# Patient Record
Sex: FEMALE | Race: WHITE | NOT HISPANIC OR LATINO | Employment: FULL TIME | URBAN - METROPOLITAN AREA
[De-identification: names, ages, dates, MRNs, and addresses within clinical notes are randomized per-mention and may not be internally consistent; named-entity substitution may affect disease eponyms.]

---

## 2023-03-21 DIAGNOSIS — F32.1 MODERATE MAJOR DEPRESSION (MULTI): ICD-10-CM

## 2023-03-21 DIAGNOSIS — E78.2 MIXED HYPERLIPIDEMIA: ICD-10-CM

## 2023-03-21 DIAGNOSIS — E11.65 TYPE 2 DIABETES MELLITUS WITH HYPERGLYCEMIA, WITHOUT LONG-TERM CURRENT USE OF INSULIN (MULTI): ICD-10-CM

## 2023-03-21 DIAGNOSIS — F41.9 ANXIETY: Primary | ICD-10-CM

## 2023-03-21 PROBLEM — N39.0 URINARY TRACT INFECTION, SITE NOT SPECIFIED: Status: ACTIVE | Noted: 2023-03-01

## 2023-03-21 PROBLEM — F17.210 CIGARETTE NICOTINE DEPENDENCE WITHOUT COMPLICATION: Status: ACTIVE | Noted: 2023-03-21

## 2023-03-21 PROBLEM — M79.89 OTHER SPECIFIED SOFT TISSUE DISORDERS: Status: ACTIVE | Noted: 2023-03-20

## 2023-03-21 PROBLEM — Z78.9 CAFFEINE USE: Status: ACTIVE | Noted: 2023-03-21

## 2023-03-21 PROBLEM — M65.4 DE QUERVAIN'S DISEASE (TENOSYNOVITIS): Status: ACTIVE | Noted: 2023-03-21

## 2023-03-21 PROBLEM — N92.6 IRREGULAR MENSES: Status: ACTIVE | Noted: 2023-03-21

## 2023-03-21 PROBLEM — R10.9 ABDOMINAL PAIN: Status: ACTIVE | Noted: 2023-03-21

## 2023-03-21 PROBLEM — R10.31 RIGHT LOWER QUADRANT PAIN: Status: ACTIVE | Noted: 2023-03-01

## 2023-03-21 PROBLEM — R11.0 NAUSEA: Status: ACTIVE | Noted: 2023-03-01

## 2023-03-21 PROBLEM — K42.9 UMBILICAL HERNIA WITHOUT OBSTRUCTION OR GANGRENE: Status: ACTIVE | Noted: 2023-03-01

## 2023-03-21 PROBLEM — H40.9 GLAUCOMA, RIGHT EYE: Status: ACTIVE | Noted: 2023-03-21

## 2023-03-21 PROBLEM — M53.9 MULTILEVEL DEGENERATIVE DISC DISEASE: Status: ACTIVE | Noted: 2023-03-21

## 2023-03-21 RX ORDER — GLIMEPIRIDE 4 MG/1
4 TABLET ORAL 2 TIMES DAILY
Qty: 180 TABLET | Refills: 1
Start: 2023-03-21 | End: 2023-05-18 | Stop reason: WASHOUT

## 2023-03-21 RX ORDER — FLUOXETINE HYDROCHLORIDE 20 MG/1
20 CAPSULE ORAL DAILY
Qty: 90 CAPSULE | Refills: 1
Start: 2023-03-21 | End: 2023-09-11 | Stop reason: SDUPTHER

## 2023-03-21 RX ORDER — ATORVASTATIN CALCIUM 20 MG/1
20 TABLET, FILM COATED ORAL DAILY
Qty: 90 TABLET | Refills: 1
Start: 2023-03-21 | End: 2024-03-04

## 2023-03-21 RX ORDER — METFORMIN HYDROCHLORIDE 1000 MG/1
1000 TABLET ORAL
Qty: 180 TABLET | Refills: 1
Start: 2023-03-21 | End: 2023-05-18 | Stop reason: WASHOUT

## 2023-03-21 RX ORDER — GLIMEPIRIDE 4 MG/1
4 TABLET ORAL 2 TIMES DAILY
COMMUNITY
Start: 2022-06-17 | End: 2023-03-21 | Stop reason: SDUPTHER

## 2023-03-21 RX ORDER — FLUOXETINE HYDROCHLORIDE 20 MG/1
20 CAPSULE ORAL DAILY
COMMUNITY
End: 2023-03-21 | Stop reason: SDUPTHER

## 2023-03-21 RX ORDER — ATORVASTATIN CALCIUM 20 MG/1
20 TABLET, FILM COATED ORAL DAILY
COMMUNITY
End: 2023-03-21 | Stop reason: SDUPTHER

## 2023-03-21 RX ORDER — METFORMIN HYDROCHLORIDE 1000 MG/1
1000 TABLET ORAL
COMMUNITY
End: 2023-03-21 | Stop reason: SDUPTHER

## 2023-03-21 RX ORDER — ATORVASTATIN CALCIUM 20 MG/1
20 TABLET, FILM COATED ORAL DAILY
Qty: 30 TABLET | Refills: 2 | COMMUNITY
Start: 2022-12-05 | End: 2023-03-05 | Stop reason: WASHOUT

## 2023-04-26 ENCOUNTER — HOSPITAL ENCOUNTER (OUTPATIENT)
Dept: DATA CONVERSION | Facility: HOSPITAL | Age: 33
End: 2023-04-26
Attending: SURGERY | Admitting: SURGERY
Payer: COMMERCIAL

## 2023-04-26 DIAGNOSIS — E66.9 OBESITY, UNSPECIFIED: ICD-10-CM

## 2023-04-26 DIAGNOSIS — E78.5 HYPERLIPIDEMIA, UNSPECIFIED: ICD-10-CM

## 2023-04-26 DIAGNOSIS — E11.9 TYPE 2 DIABETES MELLITUS WITHOUT COMPLICATIONS (MULTI): ICD-10-CM

## 2023-04-26 DIAGNOSIS — Z79.4 LONG TERM (CURRENT) USE OF INSULIN (MULTI): ICD-10-CM

## 2023-04-26 DIAGNOSIS — F17.200 NICOTINE DEPENDENCE, UNSPECIFIED, UNCOMPLICATED: ICD-10-CM

## 2023-04-26 DIAGNOSIS — F41.9 ANXIETY DISORDER, UNSPECIFIED: ICD-10-CM

## 2023-04-26 DIAGNOSIS — K42.0 UMBILICAL HERNIA WITH OBSTRUCTION, WITHOUT GANGRENE: ICD-10-CM

## 2023-04-26 DIAGNOSIS — K42.9 UMBILICAL HERNIA WITHOUT OBSTRUCTION OR GANGRENE: ICD-10-CM

## 2023-04-26 LAB — POCT GLUCOSE: 161 MG/DL (ref 74–99)

## 2023-05-02 LAB — HCG, URINE: NEGATIVE

## 2023-05-18 ENCOUNTER — OFFICE VISIT (OUTPATIENT)
Dept: PRIMARY CARE | Facility: CLINIC | Age: 33
End: 2023-05-18
Payer: COMMERCIAL

## 2023-05-18 VITALS
HEIGHT: 69 IN | TEMPERATURE: 98 F | RESPIRATION RATE: 16 BRPM | HEART RATE: 76 BPM | DIASTOLIC BLOOD PRESSURE: 80 MMHG | WEIGHT: 293 LBS | SYSTOLIC BLOOD PRESSURE: 122 MMHG | BODY MASS INDEX: 43.4 KG/M2 | OXYGEN SATURATION: 96 %

## 2023-05-18 DIAGNOSIS — F17.210 CIGARETTE NICOTINE DEPENDENCE WITHOUT COMPLICATION: ICD-10-CM

## 2023-05-18 DIAGNOSIS — E66.01 CLASS 3 SEVERE OBESITY DUE TO EXCESS CALORIES WITH SERIOUS COMORBIDITY AND BODY MASS INDEX (BMI) OF 45.0 TO 49.9 IN ADULT (MULTI): ICD-10-CM

## 2023-05-18 DIAGNOSIS — F41.9 ANXIETY: ICD-10-CM

## 2023-05-18 DIAGNOSIS — F32.1 MODERATE MAJOR DEPRESSION (MULTI): ICD-10-CM

## 2023-05-18 DIAGNOSIS — H40.9 GLAUCOMA OF RIGHT EYE, UNSPECIFIED GLAUCOMA TYPE: ICD-10-CM

## 2023-05-18 DIAGNOSIS — E11.65 TYPE 2 DIABETES MELLITUS WITH HYPERGLYCEMIA, WITHOUT LONG-TERM CURRENT USE OF INSULIN (MULTI): Primary | ICD-10-CM

## 2023-05-18 DIAGNOSIS — M53.9 MULTILEVEL DEGENERATIVE DISC DISEASE: ICD-10-CM

## 2023-05-18 DIAGNOSIS — E78.2 MIXED HYPERLIPIDEMIA: ICD-10-CM

## 2023-05-18 DIAGNOSIS — M72.6 NECROTIZING FASCIITIS (MULTI): ICD-10-CM

## 2023-05-18 PROBLEM — K42.9 UMBILICAL HERNIA WITHOUT OBSTRUCTION OR GANGRENE: Status: RESOLVED | Noted: 2023-03-01 | Resolved: 2023-05-18

## 2023-05-18 PROBLEM — R10.9 ABDOMINAL PAIN: Status: RESOLVED | Noted: 2023-03-21 | Resolved: 2023-05-18

## 2023-05-18 PROBLEM — R10.31 RIGHT LOWER QUADRANT PAIN: Status: RESOLVED | Noted: 2023-03-01 | Resolved: 2023-05-18

## 2023-05-18 PROBLEM — M65.4 DE QUERVAIN'S DISEASE (TENOSYNOVITIS): Status: RESOLVED | Noted: 2023-03-21 | Resolved: 2023-05-18

## 2023-05-18 PROBLEM — M79.89 OTHER SPECIFIED SOFT TISSUE DISORDERS: Status: RESOLVED | Noted: 2023-03-20 | Resolved: 2023-05-18

## 2023-05-18 PROBLEM — N92.6 IRREGULAR MENSES: Status: RESOLVED | Noted: 2023-03-21 | Resolved: 2023-05-18

## 2023-05-18 PROBLEM — N39.0 URINARY TRACT INFECTION, SITE NOT SPECIFIED: Status: RESOLVED | Noted: 2023-03-01 | Resolved: 2023-05-18

## 2023-05-18 PROBLEM — N89.8 VAGINAL LESION: Status: RESOLVED | Noted: 2023-05-18 | Resolved: 2023-05-18

## 2023-05-18 PROBLEM — R11.0 NAUSEA: Status: RESOLVED | Noted: 2023-03-01 | Resolved: 2023-05-18

## 2023-05-18 PROBLEM — E66.813 CLASS 3 SEVERE OBESITY DUE TO EXCESS CALORIES WITH SERIOUS COMORBIDITY AND BODY MASS INDEX (BMI) OF 45.0 TO 49.9 IN ADULT: Status: ACTIVE | Noted: 2023-05-18

## 2023-05-18 PROBLEM — N89.8 VAGINAL LESION: Status: ACTIVE | Noted: 2023-05-18

## 2023-05-18 PROCEDURE — 3079F DIAST BP 80-89 MM HG: CPT | Performed by: FAMILY MEDICINE

## 2023-05-18 PROCEDURE — 4004F PT TOBACCO SCREEN RCVD TLK: CPT | Performed by: FAMILY MEDICINE

## 2023-05-18 PROCEDURE — 3008F BODY MASS INDEX DOCD: CPT | Performed by: FAMILY MEDICINE

## 2023-05-18 PROCEDURE — 3046F HEMOGLOBIN A1C LEVEL >9.0%: CPT | Performed by: FAMILY MEDICINE

## 2023-05-18 PROCEDURE — 3074F SYST BP LT 130 MM HG: CPT | Performed by: FAMILY MEDICINE

## 2023-05-18 PROCEDURE — 99203 OFFICE O/P NEW LOW 30 MIN: CPT | Performed by: FAMILY MEDICINE

## 2023-05-18 RX ORDER — PEN NEEDLE, DIABETIC 32GX 5/32"
NEEDLE, DISPOSABLE MISCELLANEOUS
COMMUNITY
Start: 2023-05-17 | End: 2024-05-23

## 2023-05-18 RX ORDER — INSULIN ASPART 100 [IU]/ML
INJECTION, SOLUTION INTRAVENOUS; SUBCUTANEOUS
COMMUNITY
Start: 2023-04-24 | End: 2023-09-11 | Stop reason: SDUPTHER

## 2023-05-18 RX ORDER — INSULIN GLARGINE 100 [IU]/ML
64 INJECTION, SOLUTION SUBCUTANEOUS NIGHTLY
Qty: 19.2 ML | Refills: 0 | Status: SHIPPED | OUTPATIENT
Start: 2023-05-18 | End: 2023-05-22

## 2023-05-18 RX ORDER — UBIQUINOL 100 MG
CAPSULE ORAL
COMMUNITY
Start: 2023-03-27 | End: 2023-05-18 | Stop reason: SDUPTHER

## 2023-05-18 RX ORDER — INSULIN GLARGINE 100 [IU]/ML
INJECTION, SOLUTION SUBCUTANEOUS
COMMUNITY
Start: 2023-04-24 | End: 2023-05-18 | Stop reason: SDUPTHER

## 2023-05-18 RX ORDER — BLOOD SUGAR DIAGNOSTIC
STRIP MISCELLANEOUS
COMMUNITY
Start: 2023-04-21 | End: 2023-10-16 | Stop reason: SDUPTHER

## 2023-05-18 RX ORDER — MELOXICAM 15 MG/1
15 TABLET ORAL DAILY
Qty: 90 TABLET | Refills: 1 | Status: SHIPPED | OUTPATIENT
Start: 2023-05-18 | End: 2023-11-14

## 2023-05-18 RX ORDER — ISOPROPYL ALCOHOL 70 ML/100ML
SWAB TOPICAL
Qty: 400 EACH | Refills: 3 | Status: SHIPPED | OUTPATIENT
Start: 2023-05-18 | End: 2023-10-16 | Stop reason: SDUPTHER

## 2023-05-18 ASSESSMENT — ENCOUNTER SYMPTOMS
DIAPHORESIS: 0
NAUSEA: 0
WHEEZING: 0
CONFUSION: 0
DIARRHEA: 0
CHILLS: 0
COUGH: 0
UNEXPECTED WEIGHT CHANGE: 0
SINUS PAIN: 0
CHEST TIGHTNESS: 0
POLYDIPSIA: 0
ABDOMINAL PAIN: 0
HEMATURIA: 0
DYSURIA: 0
PALPITATIONS: 0
LIGHT-HEADEDNESS: 0
FREQUENCY: 0
DIZZINESS: 0
POLYPHAGIA: 0
SHORTNESS OF BREATH: 0
SINUS PRESSURE: 0
NUMBNESS: 0
HEADACHES: 0
SORE THROAT: 0
DYSPHORIC MOOD: 0
FEVER: 0
NERVOUS/ANXIOUS: 0
ADENOPATHY: 0
CONSTIPATION: 0
VOMITING: 0

## 2023-05-18 ASSESSMENT — PATIENT HEALTH QUESTIONNAIRE - PHQ9
3. TROUBLE FALLING OR STAYING ASLEEP OR SLEEPING TOO MUCH: SEVERAL DAYS
4. FEELING TIRED OR HAVING LITTLE ENERGY: SEVERAL DAYS
SUM OF ALL RESPONSES TO PHQ QUESTIONS 1-9: 14
9. THOUGHTS THAT YOU WOULD BE BETTER OFF DEAD, OR OF HURTING YOURSELF: SEVERAL DAYS
7. TROUBLE CONCENTRATING ON THINGS, SUCH AS READING THE NEWSPAPER OR WATCHING TELEVISION: SEVERAL DAYS
5. POOR APPETITE OR OVEREATING: MORE THAN HALF THE DAYS
6. FEELING BAD ABOUT YOURSELF - OR THAT YOU ARE A FAILURE OR HAVE LET YOURSELF OR YOUR FAMILY DOWN: MORE THAN HALF THE DAYS
1. LITTLE INTEREST OR PLEASURE IN DOING THINGS: NEARLY EVERY DAY
8. MOVING OR SPEAKING SO SLOWLY THAT OTHER PEOPLE COULD HAVE NOTICED. OR THE OPPOSITE, BEING SO FIGETY OR RESTLESS THAT YOU HAVE BEEN MOVING AROUND A LOT MORE THAN USUAL: SEVERAL DAYS
2. FEELING DOWN, DEPRESSED OR HOPELESS: MORE THAN HALF THE DAYS
SUM OF ALL RESPONSES TO PHQ9 QUESTIONS 1 AND 2: 5

## 2023-05-18 ASSESSMENT — ANXIETY QUESTIONNAIRES
3. WORRYING TOO MUCH ABOUT DIFFERENT THINGS: MORE THAN HALF THE DAYS
4. TROUBLE RELAXING: SEVERAL DAYS
2. NOT BEING ABLE TO STOP OR CONTROL WORRYING: NEARLY EVERY DAY
6. BECOMING EASILY ANNOYED OR IRRITABLE: MORE THAN HALF THE DAYS
5. BEING SO RESTLESS THAT IT IS HARD TO SIT STILL: MORE THAN HALF THE DAYS
1. FEELING NERVOUS, ANXIOUS, OR ON EDGE: MORE THAN HALF THE DAYS
7. FEELING AFRAID AS IF SOMETHING AWFUL MIGHT HAPPEN: SEVERAL DAYS
GAD7 TOTAL SCORE: 13

## 2023-05-18 NOTE — ASSESSMENT & PLAN NOTE
- was recently hospitalized for left groin infection. Underwent I&D. Was on antibiotics   - following with Dr. Kang

## 2023-05-18 NOTE — PATIENT INSTRUCTIONS
Aziza Rashid ,    Thank you for coming in today. We at [facility] appreciate your trust in our care. If you have any questions or concerns about the care you received today, please do not hesitate to contact us at [phone].    The following instructions were discussed today:    - refer to [facility]   - get labs  - For blood work: Nothing to eat or drink for at least 10 hours prior. Okay for water or black coffee.   - Follow up in 3 months.

## 2023-05-18 NOTE — ASSESSMENT & PLAN NOTE
- used to follow with psychiatry but her psychiatrist retired  - she is on fluoxetine and gets moderate relief from that  - refer to psychiatry

## 2023-05-18 NOTE — PROGRESS NOTES
Subjective   Patient ID: Aziza Rashid is a 32 y.o. female who presents for new  to [facility] (While waiting to get in to be seen here, she has had 3 surgeries, vaginal area, major bacterial infection, wound vac, hernia surgery.  She has been off work since March.  Patient wants to talk about whether to have insulin filled with you or Endo who she will see on June 9th.).    32 y.o. female here to establish care. Past medical history, past surgical history, medications, allergies, family history, and social history reviewed with patient and updated in chart.     Was hospitalized end of March. admitted with necrotizing soft tissue infection of left groin with uncontrolled diabetes. Was taken to OR emergently for debridement and wound vac placement. Improved slowly required 2 additional returns to the OR re-assessment and wound vac removal. Saw Dr. Roth for this. In April, had incarcerated umbilical hernia and had that repaired. Saw Dr. Kang for this has well.     Seeing Dr. Gonzales for her diabetes. Most recent HBA1c 10.4, which is down from previous of 11.          Review of Systems   Constitutional:  Negative for chills, diaphoresis, fever and unexpected weight change.   HENT:  Negative for congestion, sinus pressure, sinus pain, sneezing and sore throat.    Respiratory:  Negative for cough, chest tightness, shortness of breath and wheezing.    Cardiovascular:  Negative for chest pain, palpitations and leg swelling.   Gastrointestinal:  Negative for abdominal pain, constipation, diarrhea, nausea and vomiting.   Endocrine: Negative for cold intolerance, heat intolerance, polydipsia, polyphagia and polyuria.   Genitourinary:  Negative for dysuria, frequency, hematuria and urgency.   Neurological:  Negative for dizziness, syncope, light-headedness, numbness and headaches.   Hematological:  Negative for adenopathy.   Psychiatric/Behavioral:  Negative for confusion and dysphoric mood. The patient is not  "nervous/anxious.        Objective   /80 (BP Location: Right arm, Patient Position: Sitting, BP Cuff Size: Large adult)   Pulse 76   Temp 36.7 °C (98 °F)   Resp 16   Ht 1.753 m (5' 9\")   Wt 146 kg (321 lb)   SpO2 96%   BMI 47.40 kg/m²     Physical Exam  Vitals and nursing note reviewed.   Constitutional:       General: She is not in acute distress.     Appearance: Normal appearance.   HENT:      Head: Normocephalic and atraumatic.      Nose: Nose normal.   Eyes:      Extraocular Movements: Extraocular movements intact.      Conjunctiva/sclera: Conjunctivae normal.      Pupils: Pupils are equal, round, and reactive to light.   Cardiovascular:      Rate and Rhythm: Normal rate and regular rhythm.      Heart sounds: No murmur heard.     No friction rub. No gallop.   Pulmonary:      Effort: Pulmonary effort is normal.      Breath sounds: Normal breath sounds. No wheezing, rhonchi or rales.   Abdominal:      General: Bowel sounds are normal. There is no distension.      Palpations: Abdomen is soft.      Tenderness: There is no abdominal tenderness.   Musculoskeletal:         General: Normal range of motion.      Cervical back: Normal range of motion and neck supple.   Skin:     General: Skin is warm and dry.   Neurological:      General: No focal deficit present.      Mental Status: She is alert and oriented to person, place, and time.      Deep Tendon Reflexes: Reflexes normal.   Psychiatric:         Mood and Affect: Mood normal.         Behavior: Behavior normal.         Thought Content: Thought content normal.         Judgment: Judgment normal.         Assessment/Plan   Problem List Items Addressed This Visit       Anxiety     - used to follow with psychiatry but her psychiatrist retired  - she is on fluoxetine and gets moderate relief from that  - refer to psychiatry          Relevant Orders    Referral to [facility]    CBC and Auto Differential    Comprehensive Metabolic Panel    TSH " with reflex to Free T4 if abnormal    Follow Up In Primary Care    BMI 45.0-49.9, adult (CMS/[facility])     - Encouraged healthy lifestyle, including adequate exercise and high fiber, low fat and low carb diet.          Relevant Orders    CBC and Auto Differential    Comprehensive Metabolic Panel    TSH with reflex to Free T4 if abnormal    Cigarette nicotine dependence without complication     - encouraged cessation          Class 3 severe obesity due to excess calories with serious comorbidity and body mass index (BMI) of 45.0 to 49.9 in adult (CMS/[facility])     - Encouraged healthy lifestyle, including adequate exercise and high fiber, low fat and low carb diet.          Relevant Orders    CBC and Auto Differential    Comprehensive Metabolic Panel    TSH with reflex to Free T4 if abnormal    Glaucoma, right eye     - follows with ophthalmology          Relevant Orders    CBC and Auto Differential    Comprehensive Metabolic Panel    TSH with reflex to Free T4 if abnormal    Mixed hyperlipidemia     - continue atorvastatin  - check labs          Relevant Orders    CBC and Auto Differential    Comprehensive Metabolic Panel    Lipid Panel    TSH with reflex to Free T4 if abnormal    Moderate major depression (CMS/[facility])     - used to follow with psychiatry but her psychiatrist retired  - she is on fluoxetine and gets moderate relief from that  - refer to psychiatry          Relevant Orders    Referral to [facility]    CBC and Auto Differential    Comprehensive Metabolic Panel    TSH with reflex to Free T4 if abnormal    Follow Up In Primary Care    Multilevel degenerative disc disease     - start meloxicam as needed          Relevant Medications    meloxicam (Mobic) 15 mg tablet    Necrotizing fasciitis (CMS/[facility])     - was recently hospitalized for left groin infection. Underwent I&D. Was on antibiotics   - following with Dr. Kang         Relevant Orders    CBC and Auto Differential    Comprehensive  Metabolic Panel    TSH with reflex to Free T4 if abnormal    Type 2 diabetes mellitus with hyperglycemia, without long-term current use of insulin (CMS/[facility]) - Primary     - follows with endocrinology, Dr. Gonzales          Relevant Medications    alcohol swabs (Alcohol Prep Pads) pads, medicated    insulin glargine (Basaglar KwikPen U-100 Insulin) 100 unit/mL (3 mL) pen    Other Relevant Orders    CBC and Auto Differential    Comprehensive Metabolic Panel    TSH with reflex to Free T4 if abnormal    Albumin , Urine Random

## 2023-06-12 ENCOUNTER — LAB (OUTPATIENT)
Dept: LAB | Facility: LAB | Age: 33
End: 2023-06-12
Payer: COMMERCIAL

## 2023-06-12 DIAGNOSIS — E66.01 CLASS 3 SEVERE OBESITY DUE TO EXCESS CALORIES WITH SERIOUS COMORBIDITY AND BODY MASS INDEX (BMI) OF 45.0 TO 49.9 IN ADULT (MULTI): ICD-10-CM

## 2023-06-12 DIAGNOSIS — M72.6 NECROTIZING FASCIITIS (MULTI): ICD-10-CM

## 2023-06-12 DIAGNOSIS — E11.65 TYPE 2 DIABETES MELLITUS WITH HYPERGLYCEMIA, WITHOUT LONG-TERM CURRENT USE OF INSULIN (MULTI): ICD-10-CM

## 2023-06-12 DIAGNOSIS — E78.2 MIXED HYPERLIPIDEMIA: ICD-10-CM

## 2023-06-12 DIAGNOSIS — H40.9 GLAUCOMA OF RIGHT EYE, UNSPECIFIED GLAUCOMA TYPE: ICD-10-CM

## 2023-06-12 DIAGNOSIS — F41.9 ANXIETY: ICD-10-CM

## 2023-06-12 DIAGNOSIS — F32.1 MODERATE MAJOR DEPRESSION (MULTI): ICD-10-CM

## 2023-06-12 LAB
ALANINE AMINOTRANSFERASE (SGPT) (U/L) IN SER/PLAS: 23 U/L (ref 7–45)
ALBUMIN (G/DL) IN SER/PLAS: 4 G/DL (ref 3.4–5)
ALBUMIN (MG/L) IN URINE: 11.5 MG/L
ALBUMIN/CREATININE (UG/MG) IN URINE: 13.6 UG/MG CRT (ref 0–30)
ALKALINE PHOSPHATASE (U/L) IN SER/PLAS: 63 U/L (ref 33–110)
ANION GAP IN SER/PLAS: 11 MMOL/L (ref 10–20)
ASPARTATE AMINOTRANSFERASE (SGOT) (U/L) IN SER/PLAS: 14 U/L (ref 9–39)
BASOPHILS (10*3/UL) IN BLOOD BY AUTOMATED COUNT: 0.05 X10E9/L (ref 0–0.1)
BASOPHILS/100 LEUKOCYTES IN BLOOD BY AUTOMATED COUNT: 0.5 % (ref 0–2)
BILIRUBIN TOTAL (MG/DL) IN SER/PLAS: 0.4 MG/DL (ref 0–1.2)
CALCIUM (MG/DL) IN SER/PLAS: 8.7 MG/DL (ref 8.6–10.3)
CARBON DIOXIDE, TOTAL (MMOL/L) IN SER/PLAS: 27 MMOL/L (ref 21–32)
CHLORIDE (MMOL/L) IN SER/PLAS: 105 MMOL/L (ref 98–107)
CHOLESTEROL (MG/DL) IN SER/PLAS: 150 MG/DL (ref 0–199)
CHOLESTEROL IN HDL (MG/DL) IN SER/PLAS: 37.3 MG/DL
CHOLESTEROL/HDL RATIO: 4
CREATININE (MG/DL) IN SER/PLAS: 0.51 MG/DL (ref 0.5–1.05)
CREATININE (MG/DL) IN URINE: 84.8 MG/DL (ref 20–320)
EOSINOPHILS (10*3/UL) IN BLOOD BY AUTOMATED COUNT: 0.43 X10E9/L (ref 0–0.7)
EOSINOPHILS/100 LEUKOCYTES IN BLOOD BY AUTOMATED COUNT: 4.2 % (ref 0–6)
ERYTHROCYTE DISTRIBUTION WIDTH (RATIO) BY AUTOMATED COUNT: 13.4 % (ref 11.5–14.5)
ERYTHROCYTE MEAN CORPUSCULAR HEMOGLOBIN CONCENTRATION (G/DL) BY AUTOMATED: 32 G/DL (ref 32–36)
ERYTHROCYTE MEAN CORPUSCULAR VOLUME (FL) BY AUTOMATED COUNT: 88 FL (ref 80–100)
ERYTHROCYTES (10*6/UL) IN BLOOD BY AUTOMATED COUNT: 5.24 X10E12/L (ref 4–5.2)
ESTIMATED AVERAGE GLUCOSE FOR HBA1C: 171 MG/DL
GFR FEMALE: >90 ML/MIN/1.73M2
GLUCOSE (MG/DL) IN SER/PLAS: 147 MG/DL (ref 74–99)
HEMATOCRIT (%) IN BLOOD BY AUTOMATED COUNT: 46.2 % (ref 36–46)
HEMOGLOBIN (G/DL) IN BLOOD: 14.8 G/DL (ref 12–16)
HEMOGLOBIN A1C/HEMOGLOBIN TOTAL IN BLOOD: 7.6 %
IMMATURE GRANULOCYTES/100 LEUKOCYTES IN BLOOD BY AUTOMATED COUNT: 0.3 % (ref 0–0.9)
LDL: 71 MG/DL (ref 0–99)
LEUKOCYTES (10*3/UL) IN BLOOD BY AUTOMATED COUNT: 10.2 X10E9/L (ref 4.4–11.3)
LYMPHOCYTES (10*3/UL) IN BLOOD BY AUTOMATED COUNT: 2.95 X10E9/L (ref 1.2–4.8)
LYMPHOCYTES/100 LEUKOCYTES IN BLOOD BY AUTOMATED COUNT: 28.8 % (ref 13–44)
MONOCYTES (10*3/UL) IN BLOOD BY AUTOMATED COUNT: 0.52 X10E9/L (ref 0.1–1)
MONOCYTES/100 LEUKOCYTES IN BLOOD BY AUTOMATED COUNT: 5.1 % (ref 2–10)
NEUTROPHILS (10*3/UL) IN BLOOD BY AUTOMATED COUNT: 6.26 X10E9/L (ref 1.2–7.7)
NEUTROPHILS/100 LEUKOCYTES IN BLOOD BY AUTOMATED COUNT: 61.1 % (ref 40–80)
NON HDL CHOLESTEROL: 113 MG/DL
PLATELETS (10*3/UL) IN BLOOD AUTOMATED COUNT: 213 X10E9/L (ref 150–450)
POTASSIUM (MMOL/L) IN SER/PLAS: 4.1 MMOL/L (ref 3.5–5.3)
PROTEIN TOTAL: 6.6 G/DL (ref 6.4–8.2)
SODIUM (MMOL/L) IN SER/PLAS: 139 MMOL/L (ref 136–145)
THYROTROPIN (MIU/L) IN SER/PLAS BY DETECTION LIMIT <= 0.05 MIU/L: 1.09 MIU/L (ref 0.44–3.98)
TRIGLYCERIDE (MG/DL) IN SER/PLAS: 210 MG/DL (ref 0–149)
UREA NITROGEN (MG/DL) IN SER/PLAS: 15 MG/DL (ref 6–23)
VLDL: 42 MG/DL (ref 0–40)

## 2023-06-12 PROCEDURE — 85025 COMPLETE CBC W/AUTO DIFF WBC: CPT

## 2023-06-12 PROCEDURE — 36415 COLL VENOUS BLD VENIPUNCTURE: CPT

## 2023-06-12 PROCEDURE — 83036 HEMOGLOBIN GLYCOSYLATED A1C: CPT

## 2023-06-12 PROCEDURE — 80053 COMPREHEN METABOLIC PANEL: CPT

## 2023-06-12 PROCEDURE — 82043 UR ALBUMIN QUANTITATIVE: CPT

## 2023-06-12 PROCEDURE — 80061 LIPID PANEL: CPT

## 2023-06-12 PROCEDURE — 82570 ASSAY OF URINE CREATININE: CPT

## 2023-06-12 PROCEDURE — 84443 ASSAY THYROID STIM HORMONE: CPT

## 2023-09-07 VITALS — HEIGHT: 69 IN | BODY MASS INDEX: 43.4 KG/M2 | WEIGHT: 293 LBS

## 2023-09-11 ENCOUNTER — TELEMEDICINE (OUTPATIENT)
Dept: PRIMARY CARE | Facility: CLINIC | Age: 33
End: 2023-09-11
Payer: COMMERCIAL

## 2023-09-11 DIAGNOSIS — F41.9 ANXIETY: ICD-10-CM

## 2023-09-11 DIAGNOSIS — F32.1 MODERATE MAJOR DEPRESSION (MULTI): ICD-10-CM

## 2023-09-11 DIAGNOSIS — M53.9 MULTILEVEL DEGENERATIVE DISC DISEASE: ICD-10-CM

## 2023-09-11 DIAGNOSIS — E11.65 TYPE 2 DIABETES MELLITUS WITH HYPERGLYCEMIA, WITHOUT LONG-TERM CURRENT USE OF INSULIN (MULTI): ICD-10-CM

## 2023-09-11 DIAGNOSIS — E78.2 MIXED HYPERLIPIDEMIA: Primary | ICD-10-CM

## 2023-09-11 PROBLEM — M72.6 NECROTIZING FASCIITIS (MULTI): Status: RESOLVED | Noted: 2023-03-28 | Resolved: 2023-09-11

## 2023-09-11 PROCEDURE — 99442 PR PHYS/QHP TELEPHONE EVALUATION 11-20 MIN: CPT | Performed by: FAMILY MEDICINE

## 2023-09-11 RX ORDER — INSULIN ASPART 100 [IU]/ML
INJECTION, SOLUTION INTRAVENOUS; SUBCUTANEOUS
Qty: 10 ML | Status: SHIPPED
Start: 2023-09-11 | End: 2023-10-16 | Stop reason: SDUPTHER

## 2023-09-11 RX ORDER — INSULIN GLARGINE 100 [IU]/ML
68 INJECTION, SOLUTION SUBCUTANEOUS NIGHTLY
Qty: 61.2 ML | Refills: 0 | Status: SHIPPED
Start: 2023-09-11 | End: 2023-10-16 | Stop reason: DRUGHIGH

## 2023-09-11 RX ORDER — FLUOXETINE HYDROCHLORIDE 20 MG/1
20 CAPSULE ORAL DAILY
Qty: 90 CAPSULE | Refills: 1 | Status: SHIPPED | OUTPATIENT
Start: 2023-09-11 | End: 2024-03-09

## 2023-09-11 ASSESSMENT — ENCOUNTER SYMPTOMS
DYSPHORIC MOOD: 0
POLYPHAGIA: 0
VOMITING: 0
NERVOUS/ANXIOUS: 0
SINUS PRESSURE: 0
CONSTIPATION: 0
WHEEZING: 0
HEMATURIA: 0
CHEST TIGHTNESS: 0
PALPITATIONS: 0
DYSURIA: 0
ADENOPATHY: 0
CONFUSION: 0
NUMBNESS: 0
FREQUENCY: 0
SHORTNESS OF BREATH: 0
UNEXPECTED WEIGHT CHANGE: 0
COUGH: 0
DIZZINESS: 0
SORE THROAT: 0
POLYDIPSIA: 0
SINUS PAIN: 0
FEVER: 0
NAUSEA: 0
CHILLS: 0
DIARRHEA: 0
ABDOMINAL PAIN: 0
HEADACHES: 0
LIGHT-HEADEDNESS: 0
DIAPHORESIS: 0

## 2023-09-11 NOTE — ASSESSMENT & PLAN NOTE
- she is no longer taking atorvastatin. When she was in the hospital, they stopped giving it to her and she never got it refilled  - will restart atorvastatin. Check lipids in 3 months

## 2023-09-11 NOTE — PATIENT INSTRUCTIONS
Aziza Rashid ,    Thank you for coming in today. We at [facility] appreciate your trust in our care. If you have any questions or concerns about the care you received today, please do not hesitate to contact us at [phone].    The following instructions were discussed today:    - refer to [facility]  - other places to try: [facility] [phone]; [facility]  [phone]  -Follow up in 3 months.    - Please get blood work done 1-2 weeks prior to your next visit.   - For blood work: Nothing to eat or drink for at least 10 hours prior. Okay for water or black coffee.
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
## 2023-09-12 PROBLEM — Z79.4 TYPE 2 DIABETES MELLITUS WITH HYPERGLYCEMIA, WITH LONG-TERM CURRENT USE OF INSULIN (MULTI): Status: ACTIVE | Noted: 2023-03-21

## 2023-09-12 PROBLEM — Z79.4 LONG-TERM INSULIN USE (MULTI): Status: RESOLVED | Noted: 2023-09-12 | Resolved: 2023-09-12

## 2023-09-12 PROBLEM — Z79.4 LONG-TERM INSULIN USE (MULTI): Status: ACTIVE | Noted: 2023-09-12

## 2023-09-12 PROBLEM — E11.65 TYPE 2 DIABETES MELLITUS WITH HYPERGLYCEMIA, WITHOUT LONG-TERM CURRENT USE OF INSULIN (MULTI): Status: RESOLVED | Noted: 2023-03-21 | Resolved: 2023-09-12

## 2023-09-12 RX ORDER — FLUCONAZOLE 150 MG/1
150 TABLET ORAL
COMMUNITY
End: 2024-03-04 | Stop reason: WASHOUT

## 2023-09-12 RX ORDER — CLOTRIMAZOLE AND BETAMETHASONE DIPROPIONATE 10; .64 MG/G; MG/G
CREAM TOPICAL 2 TIMES DAILY
COMMUNITY

## 2023-09-14 NOTE — H&P
"    History of Present Illness:   Pregnant/Lactating:  ·  Are You Pregnant no (1)   ·  Are You Currently Breastfeeding no (1)     History Present Illness:  Reason for surgery: Ventral hernia   HPI:    Pt with ventral hernia here for repair.    Allergies:        Allergies:  ·  Bactrim : Unknown    Home Medication Review:   Home Medications Reviewed: yes     Impression/Procedure:   ·  Impression and Planned Procedure: ventral hernia repair       ERAS (Enhanced Recovery After Surgery):  ·  ERAS Patient: no       Physical Exam by System:    Constitutional: NAD   Respiratory/Thorax: CTA B   Cardiovascular: RRR   Gastrointestinal: Umbilical hernia     Consent:   COVID-19 Consent:  ·  COVID-19 Risk Consent Surgeon has reviewed key risks related to the risk of carmelo COVID-19 and if they contract COVID-19 what the risks are.       Electronic Signatures:  Sofi Kang)  (Signed 26-Apr-2023 09:05)   Authored: History of Present Illness, Allergies, Home  Medication Review, Impression/Procedure, ERAS, Physical Exam, Consent, Note Completion      Last Updated: 26-Apr-2023 09:05 by Sofi Kang)    References:  1.  Data Referenced From \"Patient Profile - Preop v3\" 26-Apr-2023 08:31   "

## 2023-10-02 NOTE — OP NOTE
PROCEDURE DETAILS    Preoperative Diagnosis:  Umbilical hernia, K42.9    Postoperative Diagnosis:  4 cm umbilical hernia incarcerated  Surgeon: Sofi Kang  Resident/Fellow/Other Assistant: Rosita Rojas    Procedure:  1. 4 CM UMBILICAL HERNIA REPAIR    Anesthesia: No anesthesiologist associated with this case  Estimated Blood Loss: 2  Findings: incarcerated omentum in 4 cm umbilical hernia   Specimens(s) Collected: no,           Operative Report:   Indication for procedure:  Pt with incarcerated 4 cm umbilical hernia.  Here for repair.      Procedure:  Pt taken  to OR placed supine.  General anesthesia administered pt endotracheally intubated.  Abd prepped and draped in sterile fashion.   Area surrounding the umbilicus localized with Marcaine 0.5 %.  Incision made around the umbilicus.  Carried down to subcutaneous tissue.  Umbilicus surrounded with Vera clamp.  The skin was freed from the sac.  the omentum was stuck to the hernia sac.  This was freed with careful dissection.  The omentum was unable to be reduced. This was ligated and removed,  The stumps were reduced.  The sac was excised.  The fascia was closed with 5  0-0 Ethibond  figure of eight sutures.  Umbilicus was re-tacked  to fascia.  Subcutaneous tissue re-approximated.  Skin closed with 4-0 Vicryl.  Steri-strips and dressing applied.  Binder placed pt awakened and taken to PACU in stable condition.  Note Recipients:   Required, No Pcp, Sofi Maher MD - [phone] [Preferred]                        Attestation:   Note Completion:  Attending Attestation I performed the procedure without a resident         Electronic Signatures:  Sofi Kang)  (Signed 26-Apr-2023 10:22)   Authored: Post-Operative Note, Chart Review, Note Completion      Last Updated: 26-Apr-2023 10:22 by Sofi Kang)

## 2023-10-13 ENCOUNTER — LAB (OUTPATIENT)
Dept: LAB | Facility: LAB | Age: 33
End: 2023-10-13
Payer: COMMERCIAL

## 2023-10-13 DIAGNOSIS — E11.65 TYPE 2 DIABETES MELLITUS WITH HYPERGLYCEMIA (MULTI): Primary | ICD-10-CM

## 2023-10-13 LAB
ANION GAP SERPL CALC-SCNC: 12 MMOL/L (ref 10–20)
BUN SERPL-MCNC: 14 MG/DL (ref 6–23)
CALCIUM SERPL-MCNC: 8.8 MG/DL (ref 8.6–10.3)
CHLORIDE SERPL-SCNC: 105 MMOL/L (ref 98–107)
CHOLEST SERPL-MCNC: 176 MG/DL (ref 0–199)
CHOLESTEROL/HDL RATIO: 4.6
CO2 SERPL-SCNC: 28 MMOL/L (ref 21–32)
CREAT SERPL-MCNC: 0.49 MG/DL (ref 0.5–1.05)
CREAT UR-MCNC: 77.5 MG/DL (ref 20–320)
GFR SERPL CREATININE-BSD FRML MDRD: >90 ML/MIN/1.73M*2
GLUCOSE SERPL-MCNC: 84 MG/DL (ref 74–99)
HDLC SERPL-MCNC: 38.3 MG/DL
LDLC SERPL CALC-MCNC: 80 MG/DL
MICROALBUMIN UR-MCNC: 19.8 MG/L
MICROALBUMIN/CREAT UR: 25.5 UG/MG CREAT
NON HDL CHOLESTEROL: 138 MG/DL (ref 0–149)
POTASSIUM SERPL-SCNC: 3.5 MMOL/L (ref 3.5–5.3)
SODIUM SERPL-SCNC: 141 MMOL/L (ref 136–145)
TRIGL SERPL-MCNC: 291 MG/DL (ref 0–149)
VLDL: 58 MG/DL (ref 0–40)

## 2023-10-13 PROCEDURE — 80048 BASIC METABOLIC PNL TOTAL CA: CPT

## 2023-10-13 PROCEDURE — 82570 ASSAY OF URINE CREATININE: CPT

## 2023-10-13 PROCEDURE — 80061 LIPID PANEL: CPT

## 2023-10-13 PROCEDURE — 82043 UR ALBUMIN QUANTITATIVE: CPT

## 2023-10-13 PROCEDURE — 83036 HEMOGLOBIN GLYCOSYLATED A1C: CPT

## 2023-10-13 PROCEDURE — 36415 COLL VENOUS BLD VENIPUNCTURE: CPT

## 2023-10-14 LAB
EST. AVERAGE GLUCOSE BLD GHB EST-MCNC: 186 MG/DL
HBA1C MFR BLD: 8.1 %

## 2023-10-15 PROBLEM — E78.1 HYPERTRIGLYCERIDEMIA: Status: ACTIVE | Noted: 2023-10-15

## 2023-10-15 NOTE — PATIENT INSTRUCTIONS
Thank you for choosing [facility]  for your health care needs.  If you have any questions, concerns or medical needs, please feel free to contact our office at [phone].    Please ensure you complete your blood work one week before the next scheduled appointment.    To change to Toujeo 74  units SQ bedtime  To increase NovoLog to 32 units before meals  Please continue an insulin sliding scale as follows:   150-200mg/dL - 3 units   201-250mg/dL - 6 units   251-300 mg/dL - 9 untis   301-350mg/dL - 12 units   >351mg/dL - 15 units  Counseled that the goal A1C should be 7% or less  Counseled glycemic control is warranted to prevent microvascular complications  Counseled that the fasting triglyceride level should be less than 150  Please rotate insulin injection sites  To obtain fasting blood tests before the next appointment   To commence the use of your CGM for the intensive glucose monitoring due to the dynamic nature of insulin requirements, the narrow therapeutic index of insulin and the potentially fatal consequences of treatment  Counseled to adopt an exercise regimen to include 150 minutes of moderate intensity exercise per weeker week of moderate intensity    For a diabetic dilated eye examination  For follow up in 4 months

## 2023-10-15 NOTE — ASSESSMENT & PLAN NOTE
To change to Toujeo 74  units SQ bedtime  To increase NovoLog to 32 units before meals  Please continue an insulin sliding scale as follows:   150-200mg/dL - 3 units   201-250mg/dL - 6 units   251-300 mg/dL - 9 untis   301-350mg/dL - 12 units   >351mg/dL - 15 units  Counseled that the goal A1C should be 7% or less  Counseled glycemic control is warranted to prevent microvascular complications  Counseled that the fasting triglyceride level should be less than 150  Please rotate insulin injection sites  To obtain fasting blood tests before the next appointment   To commence the use of your CGM for the intensive glucose monitoring due to the dynamic nature of insulin requirements, the narrow therapeutic index of insulin and the potentially fatal consequences of treatment  Counseled to adopt an exercise regimen to include 150 minutes of moderate intensity exercise per weeker week of moderate intensity    For a diabetic dilated eye examination  Please keep feet moisturized and inspect daily  Coupons provided today for ToujeoMax and Novolog

## 2023-10-15 NOTE — PROGRESS NOTES
"Subjective   Aziza Rashid is a 33 y.o. female who presents for follow-up of Type 2 diabetes mellitus. The initial diagnosis of diabetes was made in 2014 .     Known complications due to diabetes included obesity    Cardiovascular risk factors include diabetes mellitus and obesity (BMI >= 30 kg/m2). The patient is not on an ACE inhibitor or angiotensin II receptor blocker.  The patient has not been previously hospitalized due to diabetic ketoacidosis.     Current symptoms/problems include none. Her clinical course has been stable.       The patient is currently checking the blood glucose 3 times per day.    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  She is trying to get back into counseling.      Objective   /64 (BP Location: Left arm, Patient Position: Sitting, BP Cuff Size: Large adult)   Pulse 69   Ht 1.753 m (5' 9\")   Wt (!) 152 kg (334 lb 3.2 oz)   BMI 49.35 kg/m²   Physical Exam  Vitals and nursing note reviewed.   Constitutional:       General: She is not in acute distress.     Appearance: Normal appearance. She is obese.   HENT:      Head: Normocephalic and atraumatic.      Nose: Nose normal.      Mouth/Throat:      Mouth: Mucous membranes are moist.   Eyes:      Extraocular Movements: Extraocular movements intact.   Cardiovascular:      Rate and Rhythm: Normal rate and " regular rhythm.      Pulses:           Dorsalis pedis pulses are 2+ on the right side and 2+ on the left side.   Pulmonary:      Effort: Pulmonary effort is normal.      Breath sounds: Normal breath sounds.   Musculoskeletal:         General: Normal range of motion.      Right foot: Normal range of motion. No deformity.      Left foot: Normal range of motion. No deformity.   Feet:      Right foot:      Protective Sensation:   5 sites sensed.      Skin integrity: Dry skin present.      Toenail Condition: Right toenails are normal.      Left foot:      Protective Sensation:   5 sites sensed.      Skin integrity: Dry skin present.      Toenail Condition: Left toenails are normal.      Comments: Absent monofilament sensation to bilateral heels  Skin:     General: Skin is warm.   Neurological:      Mental Status: She is alert and oriented to person, place, and time.   Psychiatric:         Mood and Affect: Mood normal.         Lab Review  Glucose (mg/dL)   Date Value   10/13/2023 84   06/12/2023 147 (H)   03/26/2023 272 (H)   03/25/2023 213 (H)     Hemoglobin A1C   Date Value   10/13/2023 8.1 % (H)   06/12/2023 7.6 % (A)   03/21/2023 CANCELED   03/21/2023 10.4 % (A)     Bicarbonate (mmol/L)   Date Value   10/13/2023 28   06/12/2023 27   03/26/2023 28   03/25/2023 26     Urea Nitrogen (mg/dL)   Date Value   10/13/2023 14   06/12/2023 15   03/26/2023 11   03/25/2023 9     Creatinine (mg/dL)   Date Value   10/13/2023 0.49 (L)   06/12/2023 0.51   03/26/2023 0.58   03/25/2023 0.55       Health Maintenance:   Foot Exam: October 16, 2023  Eye Exam:   Lipid Panel:  October 13, 2023  Urine Albumin:  October 13, 2023    Assessment/Plan   Patient with most recent A1C of 8.1% as of October 2023.  She was intolerant of Trulicity and SGLT-2 inhibitor lead to a perineal abscess with necrotizing fascitis.  Her insulins are proving to be costly.    Type 2 diabetes mellitus with hyperglycemia, with long-term current use of insulin  (CMS/[facility])  To change to Toujeo 74  units SQ bedtime  To increase NovoLog to 32 units before meals  Please continue an insulin sliding scale as follows:   150-200mg/dL - 3 units   201-250mg/dL - 6 units   251-300 mg/dL - 9 untis   301-350mg/dL - 12 units   >351mg/dL - 15 units  Counseled that the goal A1C should be 7% or less  Counseled glycemic control is warranted to prevent microvascular complications  Counseled that the fasting triglyceride level should be less than 150  Please rotate insulin injection sites  To obtain fasting blood tests before the next appointment   To commence the use of your CGM for the intensive glucose monitoring due to the dynamic nature of insulin requirements, the narrow therapeutic index of insulin and the potentially fatal consequences of treatment  Counseled to adopt an exercise regimen to include 150 minutes of moderate intensity exercise per weeker week of moderate intensity    For a diabetic dilated eye examination  Please keep feet moisturized and inspect daily  Coupons provided today for ToujeoMax and Novolog     Hypertriglyceridemia  Triglyceride level should be less than 150  To obtain lipid panel before the next appointment      For follow up in 6 months

## 2023-10-16 ENCOUNTER — OFFICE VISIT (OUTPATIENT)
Dept: ENDOCRINOLOGY | Facility: CLINIC | Age: 33
End: 2023-10-16
Payer: COMMERCIAL

## 2023-10-16 VITALS
SYSTOLIC BLOOD PRESSURE: 116 MMHG | DIASTOLIC BLOOD PRESSURE: 64 MMHG | HEART RATE: 69 BPM | HEIGHT: 69 IN | WEIGHT: 293 LBS | BODY MASS INDEX: 43.4 KG/M2

## 2023-10-16 DIAGNOSIS — Z79.4 TYPE 2 DIABETES MELLITUS WITH HYPERGLYCEMIA, WITH LONG-TERM CURRENT USE OF INSULIN (MULTI): Primary | ICD-10-CM

## 2023-10-16 DIAGNOSIS — E11.65 TYPE 2 DIABETES MELLITUS WITH HYPERGLYCEMIA, WITHOUT LONG-TERM CURRENT USE OF INSULIN (MULTI): ICD-10-CM

## 2023-10-16 DIAGNOSIS — E11.65 TYPE 2 DIABETES MELLITUS WITH HYPERGLYCEMIA, WITH LONG-TERM CURRENT USE OF INSULIN (MULTI): Primary | ICD-10-CM

## 2023-10-16 DIAGNOSIS — E78.1 HYPERTRIGLYCERIDEMIA: ICD-10-CM

## 2023-10-16 PROCEDURE — 3048F LDL-C <100 MG/DL: CPT | Performed by: INTERNAL MEDICINE

## 2023-10-16 PROCEDURE — 3061F NEG MICROALBUMINURIA REV: CPT | Performed by: INTERNAL MEDICINE

## 2023-10-16 PROCEDURE — 3074F SYST BP LT 130 MM HG: CPT | Performed by: INTERNAL MEDICINE

## 2023-10-16 PROCEDURE — 3008F BODY MASS INDEX DOCD: CPT | Performed by: INTERNAL MEDICINE

## 2023-10-16 PROCEDURE — 99214 OFFICE O/P EST MOD 30 MIN: CPT | Performed by: INTERNAL MEDICINE

## 2023-10-16 PROCEDURE — 3078F DIAST BP <80 MM HG: CPT | Performed by: INTERNAL MEDICINE

## 2023-10-16 PROCEDURE — 3052F HG A1C>EQUAL 8.0%<EQUAL 9.0%: CPT | Performed by: INTERNAL MEDICINE

## 2023-10-16 PROCEDURE — 4004F PT TOBACCO SCREEN RCVD TLK: CPT | Performed by: INTERNAL MEDICINE

## 2023-10-16 RX ORDER — BLOOD-GLUCOSE SENSOR
1 EACH MISCELLANEOUS
Qty: 6 EACH | Refills: 11 | Status: SHIPPED | OUTPATIENT
Start: 2023-10-16 | End: 2024-10-15

## 2023-10-16 RX ORDER — INSULIN GLARGINE 300 U/ML
74 INJECTION, SOLUTION SUBCUTANEOUS NIGHTLY
Qty: 7.5 ML | Refills: 11 | Status: SHIPPED | OUTPATIENT
Start: 2023-10-16 | End: 2024-10-15

## 2023-10-16 RX ORDER — ISOPROPYL ALCOHOL 70 ML/100ML
SWAB TOPICAL
Qty: 400 EACH | Refills: 3 | Status: SHIPPED | OUTPATIENT
Start: 2023-10-16

## 2023-10-16 RX ORDER — INSULIN ASPART 100 [IU]/ML
INJECTION, SOLUTION INTRAVENOUS; SUBCUTANEOUS
Qty: 10 ML | Refills: 11 | Status: SHIPPED | OUTPATIENT
Start: 2023-10-16 | End: 2024-05-02 | Stop reason: SDUPTHER

## 2023-10-16 RX ORDER — BLOOD SUGAR DIAGNOSTIC
1 STRIP MISCELLANEOUS
Qty: 100 STRIP | Refills: 11 | Status: SHIPPED | OUTPATIENT
Start: 2023-10-16 | End: 2024-10-15

## 2023-10-30 ENCOUNTER — TELEPHONE (OUTPATIENT)
Dept: PRIMARY CARE | Facility: CLINIC | Age: 33
End: 2023-10-30
Payer: COMMERCIAL

## 2023-11-23 DIAGNOSIS — M53.9 MULTILEVEL DEGENERATIVE DISC DISEASE: ICD-10-CM

## 2023-11-27 RX ORDER — MELOXICAM 15 MG/1
15 TABLET ORAL DAILY
Qty: 90 TABLET | Refills: 1 | OUTPATIENT
Start: 2023-11-27

## 2023-11-28 DIAGNOSIS — Z79.4 TYPE 2 DIABETES MELLITUS WITH HYPERGLYCEMIA, WITH LONG-TERM CURRENT USE OF INSULIN (MULTI): Primary | ICD-10-CM

## 2023-11-28 DIAGNOSIS — E11.65 TYPE 2 DIABETES MELLITUS WITH HYPERGLYCEMIA, WITH LONG-TERM CURRENT USE OF INSULIN (MULTI): Primary | ICD-10-CM

## 2023-11-28 RX ORDER — LANCETS
EACH MISCELLANEOUS
COMMUNITY
End: 2023-11-28 | Stop reason: SDUPTHER

## 2023-11-28 RX ORDER — LANCETS
1 EACH MISCELLANEOUS
Qty: 200 EACH | Refills: 11 | Status: SHIPPED | OUTPATIENT
Start: 2023-11-28 | End: 2024-11-27

## 2023-12-12 ENCOUNTER — APPOINTMENT (OUTPATIENT)
Dept: PRIMARY CARE | Facility: CLINIC | Age: 33
End: 2023-12-12
Payer: COMMERCIAL

## 2024-03-01 ENCOUNTER — LAB (OUTPATIENT)
Dept: LAB | Facility: LAB | Age: 34
End: 2024-03-01
Payer: COMMERCIAL

## 2024-03-01 DIAGNOSIS — Z79.4 TYPE 2 DIABETES MELLITUS WITH HYPERGLYCEMIA, WITH LONG-TERM CURRENT USE OF INSULIN (MULTI): ICD-10-CM

## 2024-03-01 DIAGNOSIS — E78.1 HYPERTRIGLYCERIDEMIA: ICD-10-CM

## 2024-03-01 DIAGNOSIS — E11.65 TYPE 2 DIABETES MELLITUS WITH HYPERGLYCEMIA, WITH LONG-TERM CURRENT USE OF INSULIN (MULTI): ICD-10-CM

## 2024-03-01 LAB
CHOLEST SERPL-MCNC: 214 MG/DL (ref 0–199)
CHOLESTEROL/HDL RATIO: 6.3
EST. AVERAGE GLUCOSE BLD GHB EST-MCNC: 169 MG/DL
HBA1C MFR BLD: 7.5 %
HDLC SERPL-MCNC: 34.1 MG/DL
LDLC SERPL CALC-MCNC: 118 MG/DL
NON HDL CHOLESTEROL: 180 MG/DL (ref 0–149)
TRIGL SERPL-MCNC: 308 MG/DL (ref 0–149)
VLDL: 62 MG/DL (ref 0–40)

## 2024-03-01 PROCEDURE — 36415 COLL VENOUS BLD VENIPUNCTURE: CPT

## 2024-03-01 PROCEDURE — 80061 LIPID PANEL: CPT

## 2024-03-01 PROCEDURE — 83036 HEMOGLOBIN GLYCOSYLATED A1C: CPT

## 2024-03-04 ENCOUNTER — OFFICE VISIT (OUTPATIENT)
Dept: ENDOCRINOLOGY | Facility: CLINIC | Age: 34
End: 2024-03-04
Payer: COMMERCIAL

## 2024-03-04 VITALS
HEIGHT: 69 IN | BODY MASS INDEX: 43.4 KG/M2 | WEIGHT: 293 LBS | SYSTOLIC BLOOD PRESSURE: 110 MMHG | DIASTOLIC BLOOD PRESSURE: 64 MMHG | HEART RATE: 79 BPM

## 2024-03-04 DIAGNOSIS — E11.65 TYPE 2 DIABETES MELLITUS WITH HYPERGLYCEMIA, WITH LONG-TERM CURRENT USE OF INSULIN (MULTI): Primary | ICD-10-CM

## 2024-03-04 DIAGNOSIS — Z79.4 TYPE 2 DIABETES MELLITUS WITH HYPERGLYCEMIA, WITH LONG-TERM CURRENT USE OF INSULIN (MULTI): Primary | ICD-10-CM

## 2024-03-04 PROCEDURE — 3078F DIAST BP <80 MM HG: CPT | Performed by: INTERNAL MEDICINE

## 2024-03-04 PROCEDURE — 3074F SYST BP LT 130 MM HG: CPT | Performed by: INTERNAL MEDICINE

## 2024-03-04 PROCEDURE — 99214 OFFICE O/P EST MOD 30 MIN: CPT | Performed by: INTERNAL MEDICINE

## 2024-03-04 PROCEDURE — 3008F BODY MASS INDEX DOCD: CPT | Performed by: INTERNAL MEDICINE

## 2024-03-04 PROCEDURE — 3049F LDL-C 100-129 MG/DL: CPT | Performed by: INTERNAL MEDICINE

## 2024-03-04 PROCEDURE — 3051F HG A1C>EQUAL 7.0%<8.0%: CPT | Performed by: INTERNAL MEDICINE

## 2024-03-04 ASSESSMENT — ENCOUNTER SYMPTOMS
UNEXPECTED WEIGHT CHANGE: 1
FATIGUE: 1

## 2024-03-04 NOTE — PROGRESS NOTES
Subjective   Aziza Rashid is a 33 y.o. female who presents for follow-up of Type 2 diabetes mellitus. The initial diagnosis of diabetes was made in 2014 .     She has been doing better since her last appointment.    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  She is on new financial and spirutial paths.  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    She treid a FreeStyle Masha CGM but got a bill for $500.    She is taking homemade probiotics, and wild lettuce for pain relief     Known complications due to diabetes included obesity    Cardiovascular risk factors include diabetes mellitus and obesity (BMI >= 30 kg/m2). The patient is not on an ACE inhibitor or angiotensin II receptor blocker.  The patient has not been previously hospitalized due to diabetic ketoacidosis.     Current symptoms/problems include none. Her clinical course has been stable.       The patient is currently checking the blood glucose 3 times per day.    Patient is using: glucometer (See scanned log)                                                                                                          Hypoglycemia frequency: None   Hypoglycemia awareness: N/A     Current Medication Regimen  NovoLog 32 units before meals  Basaglar 74 units SQ bedtime     LMP in January 2024  Menarche 12 years  Stopped by age 15 years    MEALS: increased porotein intake and low glycemic index   Breakfast - eggs, bread, avocado taost, cottage cheese   Lunch - keto wraps, salads with orange, cottage cheese   Dinner - lean meats, vegetables; goes out occasionally   Snacks - sugar free Jello   Beverages - coffee, water, unsweetened iced tea, sugar free soda      Denies exercise regimen - phsyically active job with housekeeping at [employer]  She has rec passes and using ellipitical        Review of Systems   Constitutional:  Positive for fatigue and unexpected weight change (gained 30 pounds).   All other systems reviewed  "and are negative.    Objective   /64 (BP Location: Right arm, Patient Position: Sitting, BP Cuff Size: Adult)   Pulse 79   Ht 1.753 m (5' 9\")   Wt (!) 155 kg (342 lb 12.8 oz)   BMI 50.62 kg/m²   Physical Exam  Vitals and nursing note reviewed.   Constitutional:       General: She is not in acute distress.     Appearance: Normal appearance. She is obese.   HENT:      Head: Normocephalic and atraumatic.      Nose: Nose normal.      Mouth/Throat:      Mouth: Mucous membranes are moist.   Eyes:      Extraocular Movements: Extraocular movements intact.   Cardiovascular:      Rate and Rhythm: Normal rate and regular rhythm.   Pulmonary:      Effort: Pulmonary effort is normal.      Breath sounds: Normal breath sounds.   Musculoskeletal:         General: Normal range of motion.   Skin:     General: Skin is warm.   Neurological:      Mental Status: She is alert and oriented to person, place, and time.   Psychiatric:         Mood and Affect: Mood normal.         Lab Review  Glucose (mg/dL)   Date Value   10/13/2023 84   06/12/2023 147 (H)   03/26/2023 272 (H)   03/25/2023 213 (H)     Hemoglobin A1C   Date Value   03/01/2024 7.5 % (H)   10/13/2023 8.1 % (H)   06/12/2023 7.6 % (A)   03/21/2023 CANCELED   03/21/2023 10.4 % (A)     Bicarbonate (mmol/L)   Date Value   10/13/2023 28   06/12/2023 27   03/26/2023 28   03/25/2023 26     Urea Nitrogen (mg/dL)   Date Value   10/13/2023 14   06/12/2023 15   03/26/2023 11   03/25/2023 9     Creatinine (mg/dL)   Date Value   10/13/2023 0.49 (L)   06/12/2023 0.51   03/26/2023 0.58   03/25/2023 0.55     Lab Results   Component Value Date    CHOL 214 (H) 03/01/2024    CHOL 176 10/13/2023    CHOL 150 06/12/2023     Lab Results   Component Value Date    HDL 34.1 03/01/2024    HDL 38.3 10/13/2023    HDL 37.3 (A) 06/12/2023     Lab Results   Component Value Date    LDLCALC 118 (H) 03/01/2024    LDLCALC 80 10/13/2023     Lab Results   Component Value Date    TRIG 308 (H) 03/01/2024    " TRIG 291 (H) 10/13/2023    TRIG 210 (H) 06/12/2023       Health Maintenance:   Foot Exam: October 16, 2023  Eye Exam: March 4, 2024  Urine Albumin:  October 13, 2023    Assessment/Plan   33-year-old female presents for follow-up for type 2 diabetes mellitus.  She was intolerant of Trulicity and SGLT-2 inhibitor lead to a perineal abscess with necrotizing fascitis.     Type 2 diabetes mellitus with hyperglycemia, with long-term current use of insulin (CMS/[facility])  To continue Toujeo 74  units SQ bedtime  To continue  NovoLog 32 units before meals  Please continue an insulin sliding scale as follows:   150-200mg/dL - 3 units   201-250mg/dL - 6 units   251-300 mg/dL - 9 untis   301-350mg/dL - 12 units   >351mg/dL - 15 units  Counseled that the goal A1C should be 7% or less  Counseled glycemic control is warranted to prevent microvascular complications  Please rotate insulin injection sites  Counseled to adopt an exercise regimen to include 150 minutes of moderate intensity exercise per weeker week of moderate intensity    Please stick to a low carb diet   Please check blood sugars 3 times daily and keep a detailed log      Mixed hyperlipidemia  To restart Atorvastatin 20mg once daily     Irregular menstrual cycle  Insulin will not cause the periods to be irregular  To see gynecologist in April 2024    For follow up in 4-5 months

## 2024-03-04 NOTE — PATIENT INSTRUCTIONS
Thank you for choosing [facility]  for your health care needs.  If you have any questions, concerns or medical needs, please feel free to contact our office at [phone].    Please ensure you complete your blood work one week before the next scheduled appointment.    To continue Toujeo 74  units SQ bedtime  To continue  NovoLog 32 units before meals  Please continue an insulin sliding scale as follows:   150-200mg/dL - 3 units   201-250mg/dL - 6 units   251-300 mg/dL - 9 untis   301-350mg/dL - 12 units   >351mg/dL - 15 units  Counseled that the goal A1C should be 7% or less  Counseled glycemic control is warranted to prevent microvascular complications  Please rotate insulin injection sites  Counseled to adopt an exercise regimen to include 150 minutes of moderate intensity exercise per weeker week of moderate intensity    Please stick to a low carb diet   To restart Atorvastatin 20mg once daily   Insulin will not cause the periods to be irregular  To see gynecologist in April 2024  For follow up in 4-5 months

## 2024-03-07 NOTE — ASSESSMENT & PLAN NOTE
To continue Toujeo 74  units SQ bedtime  To continue  NovoLog 32 units before meals  Please continue an insulin sliding scale as follows:   150-200mg/dL - 3 units   201-250mg/dL - 6 units   251-300 mg/dL - 9 untis   301-350mg/dL - 12 units   >351mg/dL - 15 units  Counseled that the goal A1C should be 7% or less  Counseled glycemic control is warranted to prevent microvascular complications  Please rotate insulin injection sites  Counseled to adopt an exercise regimen to include 150 minutes of moderate intensity exercise per weeker week of moderate intensity    Please stick to a low carb diet   Please check blood sugars 3 times daily and keep a detailed log

## 2024-03-07 NOTE — ASSESSMENT & PLAN NOTE
Insulin will not cause the periods to be irregular  To see gynecologist in April 2024    For follow up in 4-5 months

## 2024-04-01 ENCOUNTER — APPOINTMENT (OUTPATIENT)
Dept: OBSTETRICS AND GYNECOLOGY | Facility: CLINIC | Age: 34
End: 2024-04-01
Payer: COMMERCIAL

## 2024-04-29 ENCOUNTER — APPOINTMENT (OUTPATIENT)
Dept: OBSTETRICS AND GYNECOLOGY | Facility: CLINIC | Age: 34
End: 2024-04-29
Payer: COMMERCIAL

## 2024-05-02 DIAGNOSIS — E11.65 TYPE 2 DIABETES MELLITUS WITH HYPERGLYCEMIA, WITHOUT LONG-TERM CURRENT USE OF INSULIN (MULTI): ICD-10-CM

## 2024-05-02 RX ORDER — INSULIN ASPART 100 [IU]/ML
32 INJECTION, SOLUTION INTRAVENOUS; SUBCUTANEOUS
Qty: 60 ML | Refills: 5 | Status: SHIPPED | OUTPATIENT
Start: 2024-05-02 | End: 2024-05-07

## 2024-05-02 NOTE — TELEPHONE ENCOUNTER
Patient requesting a refill on Novolog Flexpen to [facility] in [place].  Patient states she is out of medication.  Pended for approval.

## 2024-05-06 ENCOUNTER — APPOINTMENT (OUTPATIENT)
Dept: OBSTETRICS AND GYNECOLOGY | Facility: CLINIC | Age: 34
End: 2024-05-06
Payer: COMMERCIAL

## 2024-05-07 DIAGNOSIS — E11.65 TYPE 2 DIABETES MELLITUS WITH HYPERGLYCEMIA, WITHOUT LONG-TERM CURRENT USE OF INSULIN (MULTI): ICD-10-CM

## 2024-05-07 RX ORDER — INSULIN ASPART 100 [IU]/ML
32 INJECTION, SOLUTION INTRAVENOUS; SUBCUTANEOUS
Qty: 28.8 ML | Refills: 5 | Status: SHIPPED | OUTPATIENT
Start: 2024-05-07 | End: 2024-11-03

## 2024-05-08 ENCOUNTER — HOSPITAL ENCOUNTER (OUTPATIENT)
Dept: RADIOLOGY | Facility: EXTERNAL LOCATION | Age: 34
Discharge: HOME | End: 2024-05-08
Payer: COMMERCIAL

## 2024-05-08 DIAGNOSIS — M25.572 LEFT ANKLE PAIN, UNSPECIFIED CHRONICITY: ICD-10-CM

## 2024-05-08 DIAGNOSIS — M79.672 FOOT PAIN, LEFT: ICD-10-CM

## 2024-05-14 ENCOUNTER — OFFICE VISIT (OUTPATIENT)
Dept: ORTHOPEDIC SURGERY | Facility: CLINIC | Age: 34
End: 2024-05-14
Payer: COMMERCIAL

## 2024-05-14 VITALS — HEIGHT: 69 IN | WEIGHT: 293 LBS | BODY MASS INDEX: 43.4 KG/M2

## 2024-05-14 DIAGNOSIS — M21.42 PES PLANUS OF LEFT FOOT: Primary | ICD-10-CM

## 2024-05-14 PROCEDURE — 3008F BODY MASS INDEX DOCD: CPT | Performed by: EMERGENCY MEDICINE

## 2024-05-14 PROCEDURE — 99204 OFFICE O/P NEW MOD 45 MIN: CPT | Performed by: EMERGENCY MEDICINE

## 2024-05-14 PROCEDURE — 3049F LDL-C 100-129 MG/DL: CPT | Performed by: EMERGENCY MEDICINE

## 2024-05-14 PROCEDURE — 3051F HG A1C>EQUAL 7.0%<8.0%: CPT | Performed by: EMERGENCY MEDICINE

## 2024-05-14 ASSESSMENT — PAIN - FUNCTIONAL ASSESSMENT: PAIN_FUNCTIONAL_ASSESSMENT: 0-10

## 2024-05-14 ASSESSMENT — PAIN SCALES - GENERAL: PAINLEVEL_OUTOF10: 6

## 2024-05-14 NOTE — PROGRESS NOTES
Subjective    Patient ID: Aziza Rashid is a 33 y.o. female.    Chief Complaint: Pain of the Left Foot (Pain since she fell on basement step in February.  No treatment.  Worse over the past week. Xrays done in Urgent Care 05/08/2024.)     Last Surgery: No surgery found  Last Surgery Date: No surgery found    Aziza is a very pleasant 33-year-old female coming in after she injured her left foot and ankle in February earlier this year.  She fell going down some basement stairs when she was at her friend's house.  She still has some lateral ankle swelling and pain with weightbearing.  She went to urgent care on 5/8/2024 where x-rays were grossly unremarkable.  She is mostly having pain over the dorsal aspect of her foot.  She has a compression ankle sleeve but no ankle brace or boot no fevers or chills.  No other complaints here today.        Objective   Right Ankle Exam   Right ankle exam is normal.       Left Ankle Exam     Tenderness   Left ankle tenderness location: Patient has some swelling around the lateral malleolus with some mild tenderness palpation over the peroneal tendons and dorsal midfoot.   Swelling: moderate    Range of Motion   The patient has normal left ankle ROM.     Muscle Strength   The patient has normal left ankle strength.    Tests   Anterior drawer: negative  Varus tilt: negative    Other   Erythema: absent  Sensation: normal  Pulse: present    Comments:  No tenderness to palpation or laxity at the Lisfranc joint.  Calcaneal squeeze is negative.  No tenderness palpation over the syndesmosis or proximal fibular head  Bilateral pes planus            Image Results:  Urgent Care Xray  Narrative: Interpreted By:  Abdias Mccormack,   STUDY:  [facility] URGENT CARE XRAY;  5/8/2024 10:21 am      INDICATION:  Signs/Symptoms:ankle pain.      COMPARISON:  None.      ACCESSION NUMBER(S):  [id number]      ORDERING CLINICIAN:  OWEN NESS      TECHNIQUE:  Left ankle the      FINDINGS:  Three views of the  left ankle are obtained. Preserved ankle mortise.  Ossific density adjacent to the distal left fibula likely related to  remote trauma. Preserved ankle mortise. Hypertrophic changes of the  talonavicular joint and tarsal joints. Calcaneal spurs.      Impression: Hypertrophic degenerative changes without acute fracture-dislocation.          MACRO:  None      Signed by: Abdias Mccormack 5/8/2024 10:45 AM  Dictation workstation:   [id number]  Urgent Care Xray  Narrative: Interpreted By:  Abdias Mccormack,   STUDY:  [facility] URGENT CARE XRAY;  5/8/2024 9:57 am      INDICATION:  Signs/Symptoms:foot pain.      COMPARISON:  None.      ACCESSION NUMBER(S):  [id number]      ORDERING CLINICIAN:  OWEN NESS      TECHNIQUE:  Left foot      FINDINGS:  Three views of the left foot are obtained. Degenerative changes of  the 1st MTP joint and multiple D IP joints. Hypertrophic changes of  the talonavicular joint. Ossific densities are seen adjacent to the  anterior left ankle joint. Calcaneal spurs. Ossific density adjacent  to the distal left fibula.      Impression: Hypertrophic degenerative changes of the left foot. Ossific densities  adjacent to the talonavicular joint. Slight cortical irregularity and  also ossific density adjacent to the fibula. Consider dedicated left  ankle series to better evaluate. Can not exclude left ankle or talus  fractures.          MACRO:  None      Signed by: Abdias Mccormack 5/8/2024 10:07 AM  Dictation workstation:   [id number]    X-rays of the left foot and ankle were reviewed and interpreted by me on 5/14/2024 and did not reveal any evidence of acute injuries or fractures.  She does have some diffuse joint space narrowing with degenerative changes of the left foot.    Assessment/Plan   Encounter Diagnoses:  Pes planus of left foot    No orders of the defined types were placed in this encounter.    No follow-ups on file.    We discussed her treatment options and agreed to start treating this nonoperatively.   She is going to begin using prescription dose Voltaren 3-4 times daily and is also going to go to [facility] or [facility] to get moderate arch support orthotics.  She is then going to follow-up with me in about 4 weeks and if she is not feeling better at that time we could potentially perform an ultrasound-guided cortisone injection of the left midfoot likely in one of her tarsometatarsal joints for DJD. Eventually, if she does not improve, we may end up getting an MRI but I do not think that she has any surgical pathology at this time.    ** Please excuse any errors in grammar or translation related to this dictation. Voice recognition software was utilized to prepare this document. **       Raciel Hall MD  [facility]

## 2024-05-14 NOTE — LETTER
May 14, 2024     Patient: Aziza Rashid   YOB: 1990   Date of Visit: 5/14/2024       To Whom It May Concern:    Aziza was seen in the office today and it is my medical opinion that Aziza Rashid may return to full duty immediately with no restrictions.    If you have any questions or concerns, please don't hesitate to call.         Sincerely,        Raciel Hall MD    CC: No Recipients

## 2024-05-21 DIAGNOSIS — E11.65 TYPE 2 DIABETES MELLITUS WITH HYPERGLYCEMIA (MULTI): ICD-10-CM

## 2024-05-23 RX ORDER — PEN NEEDLE, DIABETIC 32GX 5/32"
NEEDLE, DISPOSABLE MISCELLANEOUS
Qty: 200 EACH | Refills: 11 | Status: SHIPPED | OUTPATIENT
Start: 2024-05-23 | End: 2025-05-23

## 2024-06-11 ENCOUNTER — APPOINTMENT (OUTPATIENT)
Dept: ORTHOPEDIC SURGERY | Facility: CLINIC | Age: 34
End: 2024-06-11
Payer: COMMERCIAL

## 2024-08-07 ENCOUNTER — APPOINTMENT (OUTPATIENT)
Dept: ENDOCRINOLOGY | Facility: CLINIC | Age: 34
End: 2024-08-07
Payer: COMMERCIAL

## 2024-09-17 ENCOUNTER — TELEPHONE (OUTPATIENT)
Dept: PRIMARY CARE | Facility: CLINIC | Age: 34
End: 2024-09-17

## 2024-09-17 ENCOUNTER — TELEMEDICINE (OUTPATIENT)
Dept: PRIMARY CARE | Facility: CLINIC | Age: 34
End: 2024-09-17
Payer: COMMERCIAL

## 2024-09-17 DIAGNOSIS — J30.2 SEASONAL ALLERGIES: ICD-10-CM

## 2024-09-17 DIAGNOSIS — E78.2 MIXED HYPERLIPIDEMIA: ICD-10-CM

## 2024-09-17 DIAGNOSIS — E78.1 HYPERTRIGLYCERIDEMIA: ICD-10-CM

## 2024-09-17 DIAGNOSIS — E66.01 CLASS 3 SEVERE OBESITY DUE TO EXCESS CALORIES WITH SERIOUS COMORBIDITY AND BODY MASS INDEX (BMI) OF 45.0 TO 49.9 IN ADULT: ICD-10-CM

## 2024-09-17 DIAGNOSIS — J01.00 ACUTE NON-RECURRENT MAXILLARY SINUSITIS: Primary | ICD-10-CM

## 2024-09-17 DIAGNOSIS — Z79.4 TYPE 2 DIABETES MELLITUS WITH HYPERGLYCEMIA, WITH LONG-TERM CURRENT USE OF INSULIN: ICD-10-CM

## 2024-09-17 DIAGNOSIS — E55.9 VITAMIN D DEFICIENCY: ICD-10-CM

## 2024-09-17 DIAGNOSIS — E11.65 TYPE 2 DIABETES MELLITUS WITH HYPERGLYCEMIA, WITH LONG-TERM CURRENT USE OF INSULIN: ICD-10-CM

## 2024-09-17 PROCEDURE — 99442 PR PHYS/QHP TELEPHONE EVALUATION 11-20 MIN: CPT | Performed by: FAMILY MEDICINE

## 2024-09-17 PROCEDURE — 3051F HG A1C>EQUAL 7.0%<8.0%: CPT | Performed by: FAMILY MEDICINE

## 2024-09-17 PROCEDURE — 3049F LDL-C 100-129 MG/DL: CPT | Performed by: FAMILY MEDICINE

## 2024-09-17 RX ORDER — FLUTICASONE PROPIONATE 50 MCG
1 SPRAY, SUSPENSION (ML) NASAL DAILY
COMMUNITY
Start: 2024-09-17

## 2024-09-17 RX ORDER — LORATADINE 10 MG/1
10 TABLET ORAL DAILY
Qty: 30 TABLET | Refills: 5 | Status: SHIPPED | OUTPATIENT
Start: 2024-09-17 | End: 2025-03-16

## 2024-09-17 RX ORDER — AZITHROMYCIN 250 MG/1
TABLET, FILM COATED ORAL
Qty: 6 TABLET | Refills: 0 | Status: SHIPPED | OUTPATIENT
Start: 2024-09-17 | End: 2024-09-21

## 2024-09-17 RX ORDER — INSULIN ASPART 100 [IU]/ML
32 INJECTION, SOLUTION INTRAVENOUS; SUBCUTANEOUS
COMMUNITY
Start: 2024-09-17

## 2024-09-17 RX ORDER — ACETAMINOPHEN 500 MG
2000 TABLET ORAL DAILY
COMMUNITY
Start: 2024-09-17

## 2024-09-17 RX ORDER — ISOPROPYL ALCOHOL 70 ML/100ML
SWAB TOPICAL
COMMUNITY
Start: 2024-09-17

## 2024-09-17 RX ORDER — PEN NEEDLE, DIABETIC 30 GX3/16"
NEEDLE, DISPOSABLE MISCELLANEOUS
Qty: 400 EACH | Refills: 1 | COMMUNITY
Start: 2024-09-17

## 2024-09-17 ASSESSMENT — ENCOUNTER SYMPTOMS
COUGH: 1
CHILLS: 0
ADENOPATHY: 0
CONFUSION: 0
DYSPHORIC MOOD: 0
SHORTNESS OF BREATH: 0
NAUSEA: 0
DIAPHORESIS: 0
DIZZINESS: 0
HEADACHES: 0
CHEST TIGHTNESS: 0
NUMBNESS: 0
VOMITING: 0
FREQUENCY: 0
NERVOUS/ANXIOUS: 0
FEVER: 0
DYSURIA: 0
CONSTIPATION: 0
LIGHT-HEADEDNESS: 0
WHEEZING: 0
POLYPHAGIA: 0
SINUS PRESSURE: 0
POLYDIPSIA: 0
UNEXPECTED WEIGHT CHANGE: 0
ABDOMINAL PAIN: 0
DIARRHEA: 0
PALPITATIONS: 0
HEMATURIA: 0
SINUS PAIN: 0
SORE THROAT: 0

## 2024-09-17 NOTE — TELEPHONE ENCOUNTER
Did an acute virtual visit with patient today. Last in person visit was 5/18/23 (and that was the only time I saw her in person and it was to establish care). Last virtual visit was 9/11/23. She needs scheduled for an in person visit physical when able.

## 2024-09-17 NOTE — ASSESSMENT & PLAN NOTE
- Encouraged healthy lifestyle, including adequate exercise and high fiber, low fat and low carb diet.    Pt advised   [facility] Pregnancy And Lactation Text: This medication is Pregnancy Category X and is absolutely contraindicated during pregnancy. It is unknown if it is excreted in breast milk.

## 2024-09-17 NOTE — PATIENT INSTRUCTIONS
Aziza Rashid ,    Thank you for coming in today. We at [facility] appreciate your trust in our care. If you have any questions or concerns about the care you received today, please do not hesitate to contact us at [phone].    The following instructions were discussed today:    - get labs. For blood work: Nothing to eat or drink for at least 10 hours prior. Okay for water or black coffee.

## 2024-09-17 NOTE — PROGRESS NOTES
Subjective   Patient ID: Aziza Rashid is a 34 y.o. female who presents for Cough.    Virtual or Telephone Consent    A telephone visit (audio only) between the patient (at the originating site) and the provider (at the distant site) was utilized to provide this telehealth service.   Verbal consent was requested and obtained from Aziza Rashid on this date, 09/17/24 for a telehealth visit.      HPI  Acute visit. Woke up last week with congestion. States it went away on its own when she was on a trip last week but when she came back home, started having issues with congestion again. She is concerned the issue might be allergies. She states she does have a lot of itching during the day. Does have cats at home. Not currently taking anything for the congestion. Does state she had a fever to 100.5 last week but none since then. Denies shortness of breath. Denies chest pain. Sick contacts include her  who was sick with similar symptoms. She is a smoker.     Patient is currently seeing Dr. Gonzales for her diabetes. She would like me to take over her diabetes care as she does not want to have multiple doctors' appointments. Her most recent HbA1c was 7.5 and that was in March 2024.     Review of Systems   Constitutional:  Negative for chills, diaphoresis, fever and unexpected weight change.   HENT:  Positive for congestion. Negative for sinus pressure, sinus pain, sneezing and sore throat.    Respiratory:  Positive for cough. Negative for chest tightness, shortness of breath and wheezing.    Cardiovascular:  Negative for chest pain, palpitations and leg swelling.   Gastrointestinal:  Negative for abdominal pain, constipation, diarrhea, nausea and vomiting.   Endocrine: Negative for cold intolerance, heat intolerance, polydipsia, polyphagia and polyuria.   Genitourinary:  Negative for dysuria, frequency, hematuria and urgency.   Neurological:  Negative for dizziness, syncope, light-headedness, numbness and  headaches.   Hematological:  Negative for adenopathy.   Psychiatric/Behavioral:  Negative for confusion and dysphoric mood. The patient is not nervous/anxious.          Assessment/Plan   Problem List Items Addressed This Visit       Acute non-recurrent maxillary sinusitis - Primary     - start [id number]          Relevant Medications    azithromycin (Zithromax) 250 mg tablet    BMI 45.0-49.9, adult (Multi)     - Encouraged healthy lifestyle, including adequate exercise and high fiber, low fat and low carb diet.          Relevant Orders    Vitamin B12    CBC and Auto Differential    Comprehensive Metabolic Panel    TSH with reflex to Free T4 if abnormal    Class 3 severe obesity due to excess calories with serious comorbidity and body mass index (BMI) of 45.0 to 49.9 in adult (Multi)     - Encouraged healthy lifestyle, including adequate exercise and high fiber, low fat and low carb diet.          Relevant Orders    Vitamin B12    CBC and Auto Differential    Comprehensive Metabolic Panel    TSH with reflex to Free T4 if abnormal    Hypertriglyceridemia     - Encouraged healthy lifestyle, including adequate exercise and high fiber, low fat and low carb diet.   - check labs          Relevant Orders    Vitamin B12    CBC and Auto Differential    Comprehensive Metabolic Panel    Lipid Panel    TSH with reflex to Free T4 if abnormal    Mixed hyperlipidemia     - Encouraged healthy lifestyle, including adequate exercise and high fiber, low fat and low carb diet.   - check labs          Relevant Orders    Vitamin B12    CBC and Auto Differential    Comprehensive Metabolic Panel    Lipid Panel    TSH with reflex to Free T4 if abnormal    Seasonal allergies     - start loratadine 10 mg daily   - start flonase as needed  - check labs          Relevant Medications    loratadine (Claritin) 10 mg tablet    fluticasone (Flonase) 50 mcg/actuation nasal spray    Other Relevant Orders    Respiratory Allergy Profile IgE    Food Allergy  "Profile IgE    Type 2 diabetes mellitus with hyperglycemia, with long-term current use of insulin (Multi)     - Patient is currently seeing Dr. Gonzales for her diabetes. She would like me to take over her diabetes care as she does not want to have multiple doctors' appointments. Her most recent HbA1c was 7.5 and that was in March 2024.   - I agreed to take over diabetic care. Will continue current regimen for now. Check labs          Relevant Medications    pen needle, diabetic (BD Elida 2nd Gen Pen Needle) 32 gauge x 5/32\" needle    alcohol swabs (Alcohol Prep Pads) pads, medicated    insulin aspart (NovoLOG Flexpen U-100 Insulin) 100 unit/mL (3 mL) pen    Other Relevant Orders    Vitamin B12    Hemoglobin A1C    Albumin-Creatinine Ratio, Urine Random    CBC and Auto Differential    Comprehensive Metabolic Panel    TSH with reflex to Free T4 if abnormal    Vitamin D deficiency    Relevant Medications    cholecalciferol (Vitamin D3) 50 mcg (2,000 unit) capsule    Other Relevant Orders    Vitamin D 25-Hydroxy,Total (for eval of Vitamin D levels)    Vitamin B12       This telephone visit lasted approximately 12 minutes.    "

## 2024-09-17 NOTE — ASSESSMENT & PLAN NOTE
- Patient is currently seeing Dr. Gonzales for her diabetes. She would like me to take over her diabetes care as she does not want to have multiple doctors' appointments. Her most recent HbA1c was 7.5 and that was in March 2024.   - I agreed to take over diabetic care. Will continue current regimen for now. Check labs

## 2024-09-17 NOTE — LETTER
September 17, 2024     Patient: Aziza Rashid   YOB: 1990   Date of Visit: 9/17/2024       To Whom It May Concern:    Aziza Rashid was seen in my clinic on 9/17/2024 at 11:30 am. Please excuse Aziza for her absence from work on this day to make the appointment.    If you have any questions or concerns, please don't hesitate to call.         Sincerely,         Allyn Olson MD

## 2024-09-19 ENCOUNTER — LAB (OUTPATIENT)
Dept: LAB | Facility: LAB | Age: 34
End: 2024-09-19
Payer: COMMERCIAL

## 2024-09-19 DIAGNOSIS — E55.9 VITAMIN D DEFICIENCY: ICD-10-CM

## 2024-09-19 DIAGNOSIS — E11.65 TYPE 2 DIABETES MELLITUS WITH HYPERGLYCEMIA, WITH LONG-TERM CURRENT USE OF INSULIN: ICD-10-CM

## 2024-09-19 DIAGNOSIS — E78.2 MIXED HYPERLIPIDEMIA: ICD-10-CM

## 2024-09-19 DIAGNOSIS — E78.1 HYPERTRIGLYCERIDEMIA: ICD-10-CM

## 2024-09-19 DIAGNOSIS — Z79.4 TYPE 2 DIABETES MELLITUS WITH HYPERGLYCEMIA, WITH LONG-TERM CURRENT USE OF INSULIN: ICD-10-CM

## 2024-09-19 DIAGNOSIS — E78.2 MIXED HYPERLIPIDEMIA: Primary | ICD-10-CM

## 2024-09-19 DIAGNOSIS — J30.2 SEASONAL ALLERGIES: ICD-10-CM

## 2024-09-19 DIAGNOSIS — E66.01 CLASS 3 SEVERE OBESITY DUE TO EXCESS CALORIES WITH SERIOUS COMORBIDITY AND BODY MASS INDEX (BMI) OF 45.0 TO 49.9 IN ADULT: ICD-10-CM

## 2024-09-19 LAB
25(OH)D3 SERPL-MCNC: 21 NG/ML (ref 30–100)
ALBUMIN SERPL BCP-MCNC: 3.9 G/DL (ref 3.4–5)
ALP SERPL-CCNC: 65 U/L (ref 33–110)
ALT SERPL W P-5'-P-CCNC: 29 U/L (ref 7–45)
ANION GAP SERPL CALC-SCNC: 10 MMOL/L (ref 10–20)
AST SERPL W P-5'-P-CCNC: 19 U/L (ref 9–39)
BASOPHILS # BLD AUTO: 0.05 X10*3/UL (ref 0–0.1)
BASOPHILS NFR BLD AUTO: 0.6 %
BILIRUB SERPL-MCNC: 0.4 MG/DL (ref 0–1.2)
BUN SERPL-MCNC: 8 MG/DL (ref 6–23)
CALCIUM SERPL-MCNC: 8.1 MG/DL (ref 8.6–10.3)
CHLORIDE SERPL-SCNC: 105 MMOL/L (ref 98–107)
CHOLEST SERPL-MCNC: 195 MG/DL (ref 0–199)
CHOLESTEROL/HDL RATIO: 5.6
CO2 SERPL-SCNC: 27 MMOL/L (ref 21–32)
CREAT SERPL-MCNC: 0.59 MG/DL (ref 0.5–1.05)
CREAT UR-MCNC: 134.5 MG/DL (ref 20–320)
EGFRCR SERPLBLD CKD-EPI 2021: >90 ML/MIN/1.73M*2
EOSINOPHIL # BLD AUTO: 0.23 X10*3/UL (ref 0–0.7)
EOSINOPHIL NFR BLD AUTO: 2.6 %
ERYTHROCYTE [DISTWIDTH] IN BLOOD BY AUTOMATED COUNT: 13.2 % (ref 11.5–14.5)
EST. AVERAGE GLUCOSE BLD GHB EST-MCNC: 177 MG/DL
GLUCOSE SERPL-MCNC: 182 MG/DL (ref 74–99)
HBA1C MFR BLD: 7.8 %
HCT VFR BLD AUTO: 44.8 % (ref 36–46)
HDLC SERPL-MCNC: 34.9 MG/DL
HGB BLD-MCNC: 14.9 G/DL (ref 12–16)
IMM GRANULOCYTES # BLD AUTO: 0.03 X10*3/UL (ref 0–0.7)
IMM GRANULOCYTES NFR BLD AUTO: 0.3 % (ref 0–0.9)
LDLC SERPL CALC-MCNC: ABNORMAL MG/DL
LYMPHOCYTES # BLD AUTO: 2.21 X10*3/UL (ref 1.2–4.8)
LYMPHOCYTES NFR BLD AUTO: 25.4 %
MCH RBC QN AUTO: 29 PG (ref 26–34)
MCHC RBC AUTO-ENTMCNC: 33.3 G/DL (ref 32–36)
MCV RBC AUTO: 87 FL (ref 80–100)
MICROALBUMIN UR-MCNC: 21.7 MG/L
MICROALBUMIN/CREAT UR: 16.1 UG/MG CREAT
MONOCYTES # BLD AUTO: 0.46 X10*3/UL (ref 0.1–1)
MONOCYTES NFR BLD AUTO: 5.3 %
NEUTROPHILS # BLD AUTO: 5.71 X10*3/UL (ref 1.2–7.7)
NEUTROPHILS NFR BLD AUTO: 65.8 %
NON HDL CHOLESTEROL: 160 MG/DL (ref 0–149)
NRBC BLD-RTO: 0 /100 WBCS (ref 0–0)
PLATELET # BLD AUTO: 231 X10*3/UL (ref 150–450)
POTASSIUM SERPL-SCNC: 4.3 MMOL/L (ref 3.5–5.3)
PROT SERPL-MCNC: 6.4 G/DL (ref 6.4–8.2)
RBC # BLD AUTO: 5.14 X10*6/UL (ref 4–5.2)
SODIUM SERPL-SCNC: 138 MMOL/L (ref 136–145)
TRIGL SERPL-MCNC: 427 MG/DL (ref 0–149)
TSH SERPL-ACNC: 1.5 MIU/L (ref 0.44–3.98)
VIT B12 SERPL-MCNC: 328 PG/ML (ref 211–911)
VLDL: ABNORMAL
WBC # BLD AUTO: 8.7 X10*3/UL (ref 4.4–11.3)

## 2024-09-19 PROCEDURE — 80061 LIPID PANEL: CPT

## 2024-09-19 PROCEDURE — 82306 VITAMIN D 25 HYDROXY: CPT

## 2024-09-19 PROCEDURE — 83036 HEMOGLOBIN GLYCOSYLATED A1C: CPT

## 2024-09-19 PROCEDURE — 36415 COLL VENOUS BLD VENIPUNCTURE: CPT

## 2024-09-19 PROCEDURE — 82043 UR ALBUMIN QUANTITATIVE: CPT

## 2024-09-19 PROCEDURE — 82785 ASSAY OF IGE: CPT

## 2024-09-19 PROCEDURE — 85025 COMPLETE CBC W/AUTO DIFF WBC: CPT

## 2024-09-19 PROCEDURE — 86003 ALLG SPEC IGE CRUDE XTRC EA: CPT

## 2024-09-19 PROCEDURE — 82570 ASSAY OF URINE CREATININE: CPT

## 2024-09-19 PROCEDURE — 84443 ASSAY THYROID STIM HORMONE: CPT

## 2024-09-19 PROCEDURE — 82607 VITAMIN B-12: CPT

## 2024-09-19 PROCEDURE — 80053 COMPREHEN METABOLIC PANEL: CPT

## 2024-09-19 NOTE — TELEPHONE ENCOUNTER
Orders in my outbox   Rifampin Counseling: I discussed with the patient the risks of rifampin including but not limited to liver damage, kidney damage, red-orange body fluids, nausea/vomiting and severe allergy.

## 2024-09-20 LAB
A ALTERNATA IGE QN: <0.1 KU/L
A FUMIGATUS IGE QN: <0.1 KU/L
BERMUDA GRASS IGE QN: <0.1 KU/L
BOXELDER IGE QN: <0.1 KU/L
C HERBARUM IGE QN: <0.1 KU/L
CALIF WALNUT POLN IGE QN: <0.1 KU/L
CAT DANDER IGE QN: <0.1 KU/L
CLAM IGE QN: <0.1 KU/L
CMN PIGWEED IGE QN: <0.1 KU/L
CODFISH IGE QN: <0.1 KU/L
COMMON RAGWEED IGE QN: <0.1 KU/L
CORN IGE QN: <0.1
COTTONWOOD IGE QN: <0.1 KU/L
D FARINAE IGE QN: <0.1 KU/L
D PTERONYSS IGE QN: 0.1 KU/L
DOG DANDER IGE QN: <0.1 KU/L
EGG WHITE IGE QN: <0.1 KU/L
ENGL PLANTAIN IGE QN: <0.1 KU/L
GOOSEFOOT IGE QN: <0.1 KU/L
JOHNSON GRASS IGE QN: <0.1 KU/L
KENT BLUE GRASS IGE QN: <0.1 KU/L
LONDON PLANE IGE QN: <0.1 KU/L
MILK IGE QN: <0.1 KU/L
MT JUNIPER IGE QN: <0.1 KU/L
P NOTATUM IGE QN: <0.1 KU/L
PEANUT IGE QN: <0.1 KU/L
PECAN/HICK TREE IGE QN: <0.1 KU/L
ROACH IGE QN: 0.18 KU/L
SALTWORT IGE QN: <0.1 KU/L
SCALLOP IGE QN: <0.1 KU/L
SESAME SEED IGE QN: <0.1 KU/L
SHEEP SORREL IGE QN: <0.1 KU/L
SHRIMP IGE QN: 0.1 KU/L
SILVER BIRCH IGE QN: <0.1 KU/L
SOYBEAN IGE QN: <0.1 KU/L
TIMOTHY IGE QN: <0.1 KU/L
TOTAL IGE SMQN RAST: 12.3 KU/L
WALNUT IGE QN: <0.1 KU/L
WHEAT IGE QN: <0.1 KU/L
WHITE ASH IGE QN: <0.1 KU/L
WHITE ELM IGE QN: <0.1 KU/L
WHITE MULBERRY IGE QN: <0.1 KU/L
WHITE OAK IGE QN: <0.1 KU/L

## 2024-10-01 ENCOUNTER — TELEPHONE (OUTPATIENT)
Dept: PRIMARY CARE | Facility: CLINIC | Age: 34
End: 2024-10-01
Payer: COMMERCIAL

## 2024-10-01 NOTE — TELEPHONE ENCOUNTER
Vasquez diaz/ [facility] lm stating they are having trouble getting ahold of the patient to get her supplies. Would like a call back from liliana.    [phone]

## 2024-10-07 DIAGNOSIS — Z79.4 TYPE 2 DIABETES MELLITUS WITH HYPERGLYCEMIA, WITH LONG-TERM CURRENT USE OF INSULIN: ICD-10-CM

## 2024-10-07 DIAGNOSIS — E11.65 TYPE 2 DIABETES MELLITUS WITH HYPERGLYCEMIA, WITH LONG-TERM CURRENT USE OF INSULIN: ICD-10-CM

## 2024-10-07 RX ORDER — INSULIN ASPART 100 [IU]/ML
32 INJECTION, SOLUTION INTRAVENOUS; SUBCUTANEOUS
Qty: 86.4 ML | Refills: 1 | Status: SHIPPED | OUTPATIENT
Start: 2024-10-07 | End: 2025-04-05

## 2024-10-11 ENCOUNTER — APPOINTMENT (OUTPATIENT)
Dept: PRIMARY CARE | Facility: CLINIC | Age: 34
End: 2024-10-11
Payer: COMMERCIAL

## 2024-10-11 DIAGNOSIS — E78.1 HYPERTRIGLYCERIDEMIA: ICD-10-CM

## 2024-10-11 DIAGNOSIS — J30.89 DUST ALLERGY: ICD-10-CM

## 2024-10-11 DIAGNOSIS — Z91.038 ALLERGY TO COCKROACHES: ICD-10-CM

## 2024-10-11 DIAGNOSIS — Z91.013 SHRIMP ALLERGY: ICD-10-CM

## 2024-10-11 DIAGNOSIS — E55.9 VITAMIN D DEFICIENCY: ICD-10-CM

## 2024-10-11 DIAGNOSIS — E78.2 MIXED HYPERLIPIDEMIA: ICD-10-CM

## 2024-10-11 DIAGNOSIS — E11.65 TYPE 2 DIABETES MELLITUS WITH HYPERGLYCEMIA, WITH LONG-TERM CURRENT USE OF INSULIN: Primary | ICD-10-CM

## 2024-10-11 DIAGNOSIS — Z79.4 TYPE 2 DIABETES MELLITUS WITH HYPERGLYCEMIA, WITH LONG-TERM CURRENT USE OF INSULIN: Primary | ICD-10-CM

## 2024-10-11 PROCEDURE — 99442 PR PHYS/QHP TELEPHONE EVALUATION 11-20 MIN: CPT | Performed by: FAMILY MEDICINE

## 2024-10-11 PROCEDURE — 3049F LDL-C 100-129 MG/DL: CPT | Performed by: FAMILY MEDICINE

## 2024-10-11 PROCEDURE — 3061F NEG MICROALBUMINURIA REV: CPT | Performed by: FAMILY MEDICINE

## 2024-10-11 PROCEDURE — 3051F HG A1C>EQUAL 7.0%<8.0%: CPT | Performed by: FAMILY MEDICINE

## 2024-10-11 RX ORDER — ATORVASTATIN CALCIUM 40 MG/1
40 TABLET, FILM COATED ORAL DAILY
Qty: 90 TABLET | Refills: 1 | Status: SHIPPED | OUTPATIENT
Start: 2024-10-11 | End: 2025-04-09

## 2024-10-11 RX ORDER — CHOLECALCIFEROL (VITAMIN D3) 125 MCG
5000 CAPSULE ORAL DAILY
Qty: 90 CAPSULE | Refills: 3 | Status: SHIPPED | OUTPATIENT
Start: 2024-10-11 | End: 2025-10-11

## 2024-10-11 ASSESSMENT — ENCOUNTER SYMPTOMS
HEMATURIA: 0
DIZZINESS: 0
CONFUSION: 0
FREQUENCY: 0
SINUS PRESSURE: 0
DYSURIA: 0
SORE THROAT: 0
UNEXPECTED WEIGHT CHANGE: 0
ADENOPATHY: 0
SHORTNESS OF BREATH: 0
DIARRHEA: 0
POLYDIPSIA: 0
CHILLS: 0
WHEEZING: 0
POLYPHAGIA: 0
CHEST TIGHTNESS: 0
PALPITATIONS: 0
ABDOMINAL PAIN: 0
FEVER: 0
COUGH: 0
DYSPHORIC MOOD: 0
NUMBNESS: 0
HEADACHES: 0
DIAPHORESIS: 0
NERVOUS/ANXIOUS: 0
VOMITING: 0
LIGHT-HEADEDNESS: 0
CONSTIPATION: 0
NAUSEA: 0
SINUS PAIN: 0

## 2024-10-11 NOTE — PROGRESS NOTES
Subjective   Patient ID: Aziza Rashid is a 34 y.o. female who presents for Results.    Virtual or Telephone Consent    A telephone visit (audio only) between the patient (at the originating site) and the provider (at the distant site) was utilized to provide this telehealth service.   Verbal consent was requested and obtained from Aziza Rashid on this date, 10/11/24 for a telehealth visit.      HPI  Appointment today to discuss lab results.     Lab on 09/19/2024   Component Date Value Ref Range Status    Vitamin D, 25-Hydroxy, Total 09/19/2024 21 (L)  30 - 100 ng/mL Final    Vitamin B12 09/19/2024 328  211 - 911 pg/mL Final    Hemoglobin A1C 09/19/2024 7.8 (H)  See comment % Final    Estimated Average Glucose 09/19/2024 177  Not Established mg/dL Final    WBC 09/19/2024 8.7  4.4 - 11.3 x10*3/uL Final    nRBC 09/19/2024 0.0  0.0 - 0.0 /100 WBCs Final    RBC 09/19/2024 5.14  4.00 - 5.20 x10*6/uL Final    Hemoglobin 09/19/2024 14.9  12.0 - 16.0 g/dL Final    Hematocrit 09/19/2024 44.8  36.0 - 46.0 % Final    MCV 09/19/2024 87  80 - 100 fL Final    MCH 09/19/2024 29.0  26.0 - 34.0 pg Final    MCHC 09/19/2024 33.3  32.0 - 36.0 g/dL Final    RDW 09/19/2024 13.2  11.5 - 14.5 % Final    Platelets 09/19/2024 231  150 - 450 x10*3/uL Final    Neutrophils % 09/19/2024 65.8  40.0 - 80.0 % Final    Immature Granulocytes %, Automated 09/19/2024 0.3  0.0 - 0.9 % Final    Immature Granulocyte Count (IG) includes promyelocytes, myelocytes and metamyelocytes but does not include bands. Percent differential counts (%) should be interpreted in the context of the absolute cell counts (cells/UL).    Lymphocytes % 09/19/2024 25.4  13.0 - 44.0 % Final    Monocytes % 09/19/2024 5.3  2.0 - 10.0 % Final    Eosinophils % 09/19/2024 2.6  0.0 - 6.0 % Final    Basophils % 09/19/2024 0.6  0.0 - 2.0 % Final    Neutrophils Absolute 09/19/2024 5.71  1.20 - 7.70 x10*3/uL Final    Percent differential counts (%) should be interpreted in the  context of the absolute cell counts (cells/uL).    Immature Granulocytes Absolute, Au* 09/19/2024 0.03  0.00 - 0.70 x10*3/uL Final    Lymphocytes Absolute 09/19/2024 2.21  1.20 - 4.80 x10*3/uL Final    Monocytes Absolute 09/19/2024 0.46  0.10 - 1.00 x10*3/uL Final    Eosinophils Absolute 09/19/2024 0.23  0.00 - 0.70 x10*3/uL Final    Basophils Absolute 09/19/2024 0.05  0.00 - 0.10 x10*3/uL Final    Glucose 09/19/2024 182 (H)  74 - 99 mg/dL Final    Sodium 09/19/2024 138  136 - 145 mmol/L Final    Potassium 09/19/2024 4.3  3.5 - 5.3 mmol/L Final    Chloride 09/19/2024 105  98 - 107 mmol/L Final    Bicarbonate 09/19/2024 27  21 - 32 mmol/L Final    Anion Gap 09/19/2024 10  10 - 20 mmol/L Final    Urea Nitrogen 09/19/2024 8  6 - 23 mg/dL Final    Creatinine 09/19/2024 0.59  0.50 - 1.05 mg/dL Final    eGFR 09/19/2024 >90  >60 mL/min/1.73m*2 Final    Calculations of estimated GFR are performed using the 2021 CKD-EPI Study Refit equation without the race variable for the IDMS-Traceable creatinine methods.  https://jasn.asnjournals.org/content/early/2021/09/22/ASN.0864580662    Calcium 09/19/2024 8.1 (L)  8.6 - 10.3 mg/dL Final    Albumin 09/19/2024 3.9  3.4 - 5.0 g/dL Final    Alkaline Phosphatase 09/19/2024 65  33 - 110 U/L Final    Total Protein 09/19/2024 6.4  6.4 - 8.2 g/dL Final    AST 09/19/2024 19  9 - 39 U/L Final    Bilirubin, Total 09/19/2024 0.4  0.0 - 1.2 mg/dL Final    ALT 09/19/2024 29  7 - 45 U/L Final    Patients treated with Sulfasalazine may generate falsely decreased results for ALT.    Cholesterol 09/19/2024 195  0 - 199 mg/dL Final          Age      Desirable   Borderline High   High     0-19 Y     0 - 169       170 - 199     >/= 200    20-24 Y     0 - 189       190 - 224     >/= 225         >24 Y     0 - 199       200 - 239     >/= 240   **All ranges are based on fasting samples. Specific   therapeutic targets will vary based on patient-specific   cardiac risk.    Pediatric guidelines  reference:Pediatrics 2011, 128(S5).Adult guidelines reference: NCEP ATPIII Guidelines,NARENDRA 2001, 258:2486-97    Venipuncture immediately after or during the administration of Metamizole may lead to falsely low results. Testing should be performed immediately prior to Metamizole dosing.    HDL-Cholesterol 09/19/2024 34.9  mg/dL Final      Age       Very Low   Low     Normal    High    0-19 Y    < 35      < 40     40-45     ----  20-24 Y    ----     < 40      >45      ----        >24 Y      ----     < 40     40-60      >60      Cholesterol/HDL Ratio 09/19/2024 5.6   Final      Ref Values  Desirable  < 3.4  High Risk  > 5.0    LDL Calculated 09/19/2024    Final    The calculation of LDL and VLDL are inaccurate when the Triglycerides are greater than 400 mg/dL or when the patient is non-fasting. If LDL measurement is necessary contact the testing laboratory for an alternative LDL assay.                                  Near   Borderline      AGE      Desirable  Optimal    High     High     Very High     0-19 Y     0 - 109     ---    110-129   >/= 130     ----    20-24 Y     0 - 119     ---    120-159   >/= 160     ----      >24 Y     0 -  99   100-129  130-159   160-189     >/=190      VLDL 09/19/2024    Final    Unable to calculate VLDL.    Triglycerides 09/19/2024 427 (H)  0 - 149 mg/dL Final       Age         Desirable   Borderline High   High     Very High   0 D-90 D    19 - 174         ----         ----        ----  91 D- 9 Y     0 -  74        75 -  99     >/= 100      ----    10-19 Y     0 -  89        90 - 129     >/= 130      ----    20-24 Y     0 - 114       115 - 149     >/= 150      ----         >24 Y     0 - 149       150 - 199    200- 499    >/= 500    Venipuncture immediately after or during the administration of Metamizole may lead to falsely low results. Testing should be performed immediately prior to Metamizole dosing.    Non HDL Cholesterol 09/19/2024 160 (H)  0 - 149 mg/dL Final          Age        Desirable   Borderline High   High     Very High     0-19 Y     0 - 119       120 - 144     >/= 145    >/= 160    20-24 Y     0 - 149       150 - 189     >/= 190      ----         >24 Y    30 mg/dL above LDL Cholesterol goal      Thyroid Stimulating Hormone 09/19/2024 1.50  0.44 - 3.98 mIU/L Final    Immunocap IgE 09/19/2024 12.3  <=214 KU/L Final    Note: Omalizumab (Xolair, GeneGingerd; humanized  IgG1 antihuman IgE Fc) treatment does not  significantly interfere with the accuracy of  total IgE on the ImmunoCAP (Mamaya) platform.  J Allergy Clin Immunol 2006;117:759-66).  Allergens, parasitic diseases, smoking, and  alcohol consumption have been reported to  increase levels of total IgE in serum.    [place] Grass IgE 09/19/2024 <0.10  <0.10 kU/L Final    [place] Grass IgE 09/19/2024 <0.10  <0.10 kU/L Final    [place] Grass, [place] Blue IgE 09/19/2024 <0.10  <0.10 kU/L Final    [place] Grass IgE 09/19/2024 <0.10  <0.10 kU/L Final    [place], [place] IgE 09/19/2024 <0.10  <0.10 kU/L Final    Common Pigweed IgE 09/19/2024 <0.10  <0.10 kU/L Final    Common Ragweed IgE 09/19/2024 <0.10  <0.10 kU/L Final    White [place] IgE 09/19/2024 <0.10  <0.10 kU/L Final    Common Silver Birch IgE 09/19/2024 <0.10  <0.10 kU/L Final    Box-Elder IgE 09/19/2024 <0.10  <0.10 kU/L Final    [place]iper IgE 09/19/2024 <0.10  <0.10 kU/L Final    [place] IgE 09/19/2024 <0.10  <0.10 kU/L Final    Elm IgE 09/19/2024 <0.10  <0.10 kU/L Final    [place] IgE 09/19/2024 <0.10  <0.10 kU/L Final    Pecan, Gem IgE 09/19/2024 <0.10  <0.10 kU/L Final    Maple [place] * 09/19/2024 <0.10  <0.10 kU/L Final    [place] Tree IgE 09/19/2024 <0.10  <0.10 kU/L Final    Russian Thistle IgE 09/19/2024 <0.10  <0.10 kU/L Final    Sheep [place] IgE 09/19/2024 <0.10  <0.10 kU/L Final    Cat Dander IgE 09/19/2024 <0.10  <0.10 kU/L Final    Dog Dander IgE 09/19/2024 <0.10  <0.10 kU/L Final    Alternaria Alternata IgE 09/19/2024  <0.10  <0.10 kU/L Final    Cladosporium Herbarum IgE 09/19/2024 <0.10  <0.10 kU/L Final    English Plantain IgE 09/19/2024 <0.10  <0.10 kU/L Final    Dust Mite (D. farinae) IgE 09/19/2024 <0.10  <0.10 kU/L Final    Dust Mite (D. pteronyssinus) IgE 09/19/2024 0.10 (Equiv IgE)  <0.10 kU/L Final    Sinhala Cockroach IgE 09/19/2024 0.18 (Equiv IgE)  <0.10 kU/L Final    Aspergillus Fumigatus IgE 09/19/2024 <0.10  <0.10 kU/L Final    [place] IgE 09/19/2024 <0.10  <0.10 kU/L Final    Penicillium Chrysogenum IgE 09/19/2024 <0.10  <0.10 kU/L Final    Clam IgE 09/19/2024 <0.10  <0.10 kU/L Final    Fish (Cod) IgE 09/19/2024 <0.10  <0.10 kU/L Final    [place], [place] IgE 09/19/2024 <0.10   Final    Egg White IgE 09/19/2024 <0.10  <0.10 kU/L Final    Cow's Milk IgE 09/19/2024 <0.10  <0.10 kU/L Final    Peanut IgE 09/19/2024 <0.10  <0.10 kU/L Final    Scallop IgE 09/19/2024 <0.10  <0.10 kU/L Final    Sesame Seed IgE 09/19/2024 <0.10  <0.10 kU/L Final    Shrimp IgE 09/19/2024 0.10 (Equiv IgE)  <0.10 kU/L Final    Soybean IgE 09/19/2024 <0.10  <0.10 kU/L Final    [place] IgE 09/19/2024 <0.10  <0.10 kU/L Final    Wheat IgE 09/19/2024 <0.10  <0.10 kU/L Final    Albumin, Urine Random 09/19/2024 21.7  Not established mg/L Final    Creatinine, Urine Random 09/19/2024 134.5  20.0 - 320.0 mg/dL Final    Albumin/Creatinine Ratio 09/19/2024 16.1  <30.0 ug/mg Creat Final        Review of Systems   Constitutional:  Negative for chills, diaphoresis, fever and unexpected weight change.   HENT:  Negative for congestion, sinus pressure, sinus pain, sneezing and sore throat.    Respiratory:  Negative for cough, chest tightness, shortness of breath and wheezing.    Cardiovascular:  Negative for chest pain, palpitations and leg swelling.   Gastrointestinal:  Negative for abdominal pain, constipation, diarrhea, nausea and vomiting.   Endocrine: Negative for cold intolerance, heat intolerance, polydipsia, polyphagia and polyuria.   Genitourinary:  Negative for  dysuria, frequency, hematuria and urgency.   Neurological:  Negative for dizziness, syncope, light-headedness, numbness and headaches.   Hematological:  Negative for adenopathy.   Psychiatric/Behavioral:  Negative for confusion and dysphoric mood. The patient is not nervous/anxious.          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    - Recheck lipids in 3 months   - may need to add fenofibrate if triglycerides still elevated in 3 months          Relevant Medications    atorvastatin (Lipitor) 40 mg tablet    Other Relevant Orders    Lipid Panel    Hepatic Function Panel    LDL cholesterol, direct    Mixed hyperlipidemia     - start atorvastatin 40 mg daily    - Recheck lipids in 3 months          Relevant Medications    atorvastatin (Lipitor) 40 mg tablet    Other Relevant Orders    Lipid Panel    Hepatic Function Panel    LDL cholesterol, direct    Shrimp allergy     - she eats shrimp with no issues and test was only equivocal, so no issues here         Type 2 diabetes mellitus with hyperglycemia, with long-term current use of insulin - Primary     - HbA1c 7.8   - current regimen: toujeo 75 units nightly and novolog 32 units three times daily   - patient admits to dietary indiscretions  - wants to try to monitor her diet   - recheck HbA1c in 3 months          Relevant Orders    Hemoglobin A1C    Vitamin D deficiency     - vitamin D low at 21  - INCREASE vitamin D to 5000 IU daily   - recheck levels in 3 months          Relevant Medications    cholecalciferol (Vitamin D-3) 125 MCG (5000 UT) capsule    Other Relevant Orders    Vitamin D 25-Hydroxy,Total (for eval of Vitamin D  levels)       This telephone visit lasted approximately 12 minutes.

## 2024-10-11 NOTE — ASSESSMENT & PLAN NOTE
- start atorvastatin 40 mg daily   - Encouraged healthy lifestyle, including adequate exercise and high fiber, low fat and low carb diet.    - Recheck lipids in 3 months   - may need to add fenofibrate if triglycerides still elevated in 3 months

## 2024-10-11 NOTE — PATIENT INSTRUCTIONS
Aziza Rashid ,    Thank you for coming in today. We at [facility] appreciate your trust in our care. If you have any questions or concerns about the care you received today, please do not hesitate to contact us at [phone].    The following instructions were discussed today:    - INCREASE vitamin D to 5000 IU daily   - start atorvastatin 40 mg daily   - Follow up 1/20/2025 as scheduled.    - Please get blood work done 1-2 weeks prior to your next visit. For blood work: Nothing to eat or drink for at least 10 hours prior. Okay for water or black coffee.

## 2024-11-26 DIAGNOSIS — Z79.4 TYPE 2 DIABETES MELLITUS WITH HYPERGLYCEMIA, WITH LONG-TERM CURRENT USE OF INSULIN: ICD-10-CM

## 2024-11-26 DIAGNOSIS — E11.65 TYPE 2 DIABETES MELLITUS WITH HYPERGLYCEMIA, WITH LONG-TERM CURRENT USE OF INSULIN: ICD-10-CM

## 2024-11-26 RX ORDER — INSULIN GLARGINE 300 [IU]/ML
74 INJECTION, SOLUTION SUBCUTANEOUS NIGHTLY
Qty: 7.5 ML | Refills: 11 | Status: SHIPPED | OUTPATIENT
Start: 2024-11-26 | End: 2025-11-26

## 2024-11-26 RX ORDER — PEN NEEDLE, DIABETIC 30 GX3/16"
NEEDLE, DISPOSABLE MISCELLANEOUS
Qty: 400 EACH | Refills: 1 | Status: SHIPPED | OUTPATIENT
Start: 2024-11-26

## 2024-12-03 ENCOUNTER — TELEPHONE (OUTPATIENT)
Dept: PRIMARY CARE | Facility: CLINIC | Age: 34
End: 2024-12-03
Payer: COMMERCIAL

## 2024-12-03 DIAGNOSIS — E11.65 TYPE 2 DIABETES MELLITUS WITH HYPERGLYCEMIA, WITH LONG-TERM CURRENT USE OF INSULIN: ICD-10-CM

## 2024-12-03 DIAGNOSIS — Z79.4 TYPE 2 DIABETES MELLITUS WITH HYPERGLYCEMIA, WITH LONG-TERM CURRENT USE OF INSULIN: ICD-10-CM

## 2024-12-03 RX ORDER — INSULIN GLARGINE 300 U/ML
74 INJECTION, SOLUTION SUBCUTANEOUS NIGHTLY
Qty: 22.2 ML | Refills: 1 | Status: SHIPPED | OUTPATIENT
Start: 2024-12-03 | End: 2025-06-01

## 2025-01-16 DIAGNOSIS — Z79.4 TYPE 2 DIABETES MELLITUS WITH HYPERGLYCEMIA, WITH LONG-TERM CURRENT USE OF INSULIN: ICD-10-CM

## 2025-01-16 DIAGNOSIS — E11.65 TYPE 2 DIABETES MELLITUS WITH HYPERGLYCEMIA, WITH LONG-TERM CURRENT USE OF INSULIN: ICD-10-CM

## 2025-01-16 RX ORDER — INSULIN ASPART 100 [IU]/ML
32 INJECTION, SOLUTION INTRAVENOUS; SUBCUTANEOUS
Qty: 86.4 ML | Refills: 1 | Status: SHIPPED | OUTPATIENT
Start: 2025-01-16 | End: 2025-07-15

## 2025-01-16 NOTE — TELEPHONE ENCOUNTER
Pt lm requesting refill on novolog, she states she had to move her appointment further out because she doesn't currently have insurance.

## 2025-01-20 ENCOUNTER — APPOINTMENT (OUTPATIENT)
Dept: PRIMARY CARE | Facility: CLINIC | Age: 35
End: 2025-01-20
Payer: COMMERCIAL

## 2025-03-02 ENCOUNTER — APPOINTMENT (OUTPATIENT)
Dept: RADIOLOGY | Facility: HOSPITAL | Age: 35
End: 2025-03-02

## 2025-03-02 ENCOUNTER — HOSPITAL ENCOUNTER (EMERGENCY)
Facility: HOSPITAL | Age: 35
Discharge: HOME | End: 2025-03-02
Attending: STUDENT IN AN ORGANIZED HEALTH CARE EDUCATION/TRAINING PROGRAM

## 2025-03-02 ENCOUNTER — APPOINTMENT (OUTPATIENT)
Dept: CARDIOLOGY | Facility: HOSPITAL | Age: 35
End: 2025-03-02

## 2025-03-02 VITALS
HEIGHT: 69 IN | RESPIRATION RATE: 18 BRPM | OXYGEN SATURATION: 98 % | DIASTOLIC BLOOD PRESSURE: 72 MMHG | WEIGHT: 293 LBS | BODY MASS INDEX: 43.4 KG/M2 | SYSTOLIC BLOOD PRESSURE: 112 MMHG | HEART RATE: 94 BPM | TEMPERATURE: 98.7 F

## 2025-03-02 DIAGNOSIS — R10.84 ABDOMINAL PAIN, GENERALIZED: Primary | ICD-10-CM

## 2025-03-02 LAB
ALBUMIN SERPL BCP-MCNC: 4.2 G/DL (ref 3.4–5)
ALP SERPL-CCNC: 59 U/L (ref 33–110)
ALT SERPL W P-5'-P-CCNC: 32 U/L (ref 7–45)
ANION GAP SERPL CALC-SCNC: 13 MMOL/L (ref 10–20)
APPEARANCE UR: ABNORMAL
AST SERPL W P-5'-P-CCNC: 24 U/L (ref 9–39)
BACTERIA #/AREA URNS AUTO: ABNORMAL /HPF
BASOPHILS # BLD AUTO: 0.04 X10*3/UL (ref 0–0.1)
BASOPHILS NFR BLD AUTO: 0.5 %
BILIRUB DIRECT SERPL-MCNC: 0.1 MG/DL (ref 0–0.3)
BILIRUB SERPL-MCNC: 0.4 MG/DL (ref 0–1.2)
BILIRUB UR STRIP.AUTO-MCNC: NEGATIVE MG/DL
BUN SERPL-MCNC: 8 MG/DL (ref 6–23)
CALCIUM SERPL-MCNC: 8.4 MG/DL (ref 8.6–10.3)
CARDIAC TROPONIN I PNL SERPL HS: 4 NG/L (ref 0–13)
CHLORIDE SERPL-SCNC: 104 MMOL/L (ref 98–107)
CO2 SERPL-SCNC: 24 MMOL/L (ref 21–32)
COLOR UR: YELLOW
CREAT SERPL-MCNC: 0.56 MG/DL (ref 0.5–1.05)
EGFRCR SERPLBLD CKD-EPI 2021: >90 ML/MIN/1.73M*2
EOSINOPHIL # BLD AUTO: 0.23 X10*3/UL (ref 0–0.7)
EOSINOPHIL NFR BLD AUTO: 3.1 %
ERYTHROCYTE [DISTWIDTH] IN BLOOD BY AUTOMATED COUNT: 13.3 % (ref 11.5–14.5)
GLUCOSE SERPL-MCNC: 119 MG/DL (ref 74–99)
GLUCOSE UR STRIP.AUTO-MCNC: NORMAL MG/DL
HCG UR QL IA.RAPID: NEGATIVE
HCT VFR BLD AUTO: 46.3 % (ref 36–46)
HGB BLD-MCNC: 15.9 G/DL (ref 12–16)
IMM GRANULOCYTES # BLD AUTO: 0.01 X10*3/UL (ref 0–0.7)
IMM GRANULOCYTES NFR BLD AUTO: 0.1 % (ref 0–0.9)
KETONES UR STRIP.AUTO-MCNC: NEGATIVE MG/DL
LACTATE SERPL-SCNC: 1.6 MMOL/L (ref 0.4–2)
LEUKOCYTE ESTERASE UR QL STRIP.AUTO: ABNORMAL
LIPASE SERPL-CCNC: 29 U/L (ref 9–82)
LYMPHOCYTES # BLD AUTO: 1.95 X10*3/UL (ref 1.2–4.8)
LYMPHOCYTES NFR BLD AUTO: 26.5 %
MCH RBC QN AUTO: 29.2 PG (ref 26–34)
MCHC RBC AUTO-ENTMCNC: 34.3 G/DL (ref 32–36)
MCV RBC AUTO: 85 FL (ref 80–100)
MONOCYTES # BLD AUTO: 0.64 X10*3/UL (ref 0.1–1)
MONOCYTES NFR BLD AUTO: 8.7 %
MUCOUS THREADS #/AREA URNS AUTO: ABNORMAL /LPF
NEUTROPHILS # BLD AUTO: 4.48 X10*3/UL (ref 1.2–7.7)
NEUTROPHILS NFR BLD AUTO: 61.1 %
NITRITE UR QL STRIP.AUTO: ABNORMAL
NRBC BLD-RTO: 0 /100 WBCS (ref 0–0)
PH UR STRIP.AUTO: 5.5 [PH]
PLATELET # BLD AUTO: 178 X10*3/UL (ref 150–450)
POTASSIUM SERPL-SCNC: 3.8 MMOL/L (ref 3.5–5.3)
PROT SERPL-MCNC: 7.1 G/DL (ref 6.4–8.2)
PROT UR STRIP.AUTO-MCNC: ABNORMAL MG/DL
RBC # BLD AUTO: 5.44 X10*6/UL (ref 4–5.2)
RBC # UR STRIP.AUTO: ABNORMAL MG/DL
RBC #/AREA URNS AUTO: ABNORMAL /HPF
SODIUM SERPL-SCNC: 137 MMOL/L (ref 136–145)
SP GR UR STRIP.AUTO: 1.03
SQUAMOUS #/AREA URNS AUTO: ABNORMAL /HPF
UROBILINOGEN UR STRIP.AUTO-MCNC: ABNORMAL MG/DL
WBC # BLD AUTO: 7.4 X10*3/UL (ref 4.4–11.3)
WBC #/AREA URNS AUTO: ABNORMAL /HPF

## 2025-03-02 PROCEDURE — 85025 COMPLETE CBC W/AUTO DIFF WBC: CPT | Performed by: STUDENT IN AN ORGANIZED HEALTH CARE EDUCATION/TRAINING PROGRAM

## 2025-03-02 PROCEDURE — 83605 ASSAY OF LACTIC ACID: CPT | Performed by: STUDENT IN AN ORGANIZED HEALTH CARE EDUCATION/TRAINING PROGRAM

## 2025-03-02 PROCEDURE — 2550000001 HC RX 255 CONTRASTS: Performed by: STUDENT IN AN ORGANIZED HEALTH CARE EDUCATION/TRAINING PROGRAM

## 2025-03-02 PROCEDURE — 80053 COMPREHEN METABOLIC PANEL: CPT | Performed by: STUDENT IN AN ORGANIZED HEALTH CARE EDUCATION/TRAINING PROGRAM

## 2025-03-02 PROCEDURE — 74177 CT ABD & PELVIS W/CONTRAST: CPT | Mod: FOREIGN READ | Performed by: SPECIALIST

## 2025-03-02 PROCEDURE — 83690 ASSAY OF LIPASE: CPT | Performed by: STUDENT IN AN ORGANIZED HEALTH CARE EDUCATION/TRAINING PROGRAM

## 2025-03-02 PROCEDURE — 81025 URINE PREGNANCY TEST: CPT | Performed by: STUDENT IN AN ORGANIZED HEALTH CARE EDUCATION/TRAINING PROGRAM

## 2025-03-02 PROCEDURE — 99285 EMERGENCY DEPT VISIT HI MDM: CPT | Mod: 25 | Performed by: STUDENT IN AN ORGANIZED HEALTH CARE EDUCATION/TRAINING PROGRAM

## 2025-03-02 PROCEDURE — 93005 ELECTROCARDIOGRAM TRACING: CPT

## 2025-03-02 PROCEDURE — 96374 THER/PROPH/DIAG INJ IV PUSH: CPT

## 2025-03-02 PROCEDURE — 80076 HEPATIC FUNCTION PANEL: CPT | Performed by: STUDENT IN AN ORGANIZED HEALTH CARE EDUCATION/TRAINING PROGRAM

## 2025-03-02 PROCEDURE — 87086 URINE CULTURE/COLONY COUNT: CPT | Mod: PORLAB | Performed by: STUDENT IN AN ORGANIZED HEALTH CARE EDUCATION/TRAINING PROGRAM

## 2025-03-02 PROCEDURE — 74177 CT ABD & PELVIS W/CONTRAST: CPT

## 2025-03-02 PROCEDURE — 84484 ASSAY OF TROPONIN QUANT: CPT | Performed by: STUDENT IN AN ORGANIZED HEALTH CARE EDUCATION/TRAINING PROGRAM

## 2025-03-02 PROCEDURE — 2500000004 HC RX 250 GENERAL PHARMACY W/ HCPCS (ALT 636 FOR OP/ED): Performed by: STUDENT IN AN ORGANIZED HEALTH CARE EDUCATION/TRAINING PROGRAM

## 2025-03-02 PROCEDURE — 81001 URINALYSIS AUTO W/SCOPE: CPT | Performed by: STUDENT IN AN ORGANIZED HEALTH CARE EDUCATION/TRAINING PROGRAM

## 2025-03-02 PROCEDURE — 36415 COLL VENOUS BLD VENIPUNCTURE: CPT | Performed by: STUDENT IN AN ORGANIZED HEALTH CARE EDUCATION/TRAINING PROGRAM

## 2025-03-02 PROCEDURE — 96375 TX/PRO/DX INJ NEW DRUG ADDON: CPT

## 2025-03-02 PROCEDURE — 82248 BILIRUBIN DIRECT: CPT | Performed by: STUDENT IN AN ORGANIZED HEALTH CARE EDUCATION/TRAINING PROGRAM

## 2025-03-02 PROCEDURE — 82374 ASSAY BLOOD CARBON DIOXIDE: CPT | Performed by: STUDENT IN AN ORGANIZED HEALTH CARE EDUCATION/TRAINING PROGRAM

## 2025-03-02 PROCEDURE — 96361 HYDRATE IV INFUSION ADD-ON: CPT

## 2025-03-02 RX ORDER — ONDANSETRON HYDROCHLORIDE 2 MG/ML
4 INJECTION, SOLUTION INTRAVENOUS ONCE
Status: COMPLETED | OUTPATIENT
Start: 2025-03-02 | End: 2025-03-02

## 2025-03-02 RX ORDER — KETOROLAC TROMETHAMINE 30 MG/ML
15 INJECTION, SOLUTION INTRAMUSCULAR; INTRAVENOUS ONCE
Status: COMPLETED | OUTPATIENT
Start: 2025-03-02 | End: 2025-03-02

## 2025-03-02 RX ADMIN — IOHEXOL 75 ML: 350 INJECTION, SOLUTION INTRAVENOUS at 17:59

## 2025-03-02 RX ADMIN — KETOROLAC TROMETHAMINE 15 MG: 30 INJECTION, SOLUTION INTRAMUSCULAR at 17:21

## 2025-03-02 RX ADMIN — SODIUM CHLORIDE 1000 ML: 9 INJECTION, SOLUTION INTRAVENOUS at 16:52

## 2025-03-02 RX ADMIN — ONDANSETRON 4 MG: 2 INJECTION INTRAMUSCULAR; INTRAVENOUS at 16:52

## 2025-03-02 ASSESSMENT — LIFESTYLE VARIABLES
EVER FELT BAD OR GUILTY ABOUT YOUR DRINKING: NO
HAVE YOU EVER FELT YOU SHOULD CUT DOWN ON YOUR DRINKING: NO
HAVE PEOPLE ANNOYED YOU BY CRITICIZING YOUR DRINKING: NO
TOTAL SCORE: 0
EVER HAD A DRINK FIRST THING IN THE MORNING TO STEADY YOUR NERVES TO GET RID OF A HANGOVER: NO

## 2025-03-02 ASSESSMENT — PAIN SCALES - GENERAL
PAINLEVEL_OUTOF10: 2
PAINLEVEL_OUTOF10: 7
PAINLEVEL_OUTOF10: 7
PAINLEVEL_OUTOF10: 2
PAINLEVEL_OUTOF10: 2

## 2025-03-02 ASSESSMENT — PAIN - FUNCTIONAL ASSESSMENT
PAIN_FUNCTIONAL_ASSESSMENT: 0-10
PAIN_FUNCTIONAL_ASSESSMENT: 0-10

## 2025-03-02 ASSESSMENT — COLUMBIA-SUICIDE SEVERITY RATING SCALE - C-SSRS
1. IN THE PAST MONTH, HAVE YOU WISHED YOU WERE DEAD OR WISHED YOU COULD GO TO SLEEP AND NOT WAKE UP?: NO
2. HAVE YOU ACTUALLY HAD ANY THOUGHTS OF KILLING YOURSELF?: NO
6. HAVE YOU EVER DONE ANYTHING, STARTED TO DO ANYTHING, OR PREPARED TO DO ANYTHING TO END YOUR LIFE?: NO

## 2025-03-02 ASSESSMENT — PAIN DESCRIPTION - PROGRESSION: CLINICAL_PROGRESSION: RAPIDLY IMPROVING

## 2025-03-03 LAB — HOLD SPECIMEN: NORMAL

## 2025-03-03 NOTE — ED PROVIDER NOTES
HPI   Chief Complaint   Patient presents with    Abdominal Pain       HPI: Patient is a 34-year-old female, she has a past medical history of hypertriglyceridemia, hyperlipidemia, type 2 diabetes, vitamin D deficiency, and prior hernia surgery performed with Dr. Kang who is presenting to the emergency department today for abdominal pain.  Symptoms have been ongoing for about a week, associated with some diarrhea, nausea, and bloating.  She is concerned that something may be going on with the hernia, she also is concerned about a potential infection as she reports she had prior history of sepsis.  No fevers or chills, no dysuria or polyuria, no flank pain, no blood in her vomit or stool.  No recent travel outside of [place].  Denies any trauma..      ROS: Complete 12 point review of systems performed, otherwise negative except as noted in the history of present illness    PMH: Reviewed, documented below in note. Pertinents in HPI  PSH: Reviewed and documented below in note. Pertinents in HPI  SH: No illicits.  Not homeless.  Fam: Reviewed, noncontributory to patients current complaint  MEDS: Reviewed and documented below in note. Pertinents in HPI  ALLERGIES: Reviewed and documented below in note.        History provided by:  Patient   used: No                          Rachel Coma Scale Score: 15                  Patient History   Past Medical History:   Diagnosis Date    De Quervain's disease (tenosynovitis) 03/21/2023    Irregular menses 03/21/2023    Necrotizing fasciitis (Multi) 03/28/2023    Perineal abscess 03/2023    Suicide attempt (Multi)     Umbilical hernia without obstruction or gangrene 03/01/2023     Past Surgical History:   Procedure Laterality Date    ABSCESS DRAINAGE  03/19/2023    perineal abscess    UMBILICAL HERNIA REPAIR  04/2023     Family History   Problem Relation Name Age of Onset    Heart defect Mother      Diabetes type II Mother      Crohn's disease Father       "Diabetes type II Other       Social History     Tobacco Use    Smoking status: Every Day     Current packs/day: 0.50     Average packs/day: 0.5 packs/day for 20.2 years (10.1 ttl pk-yrs)     Types: Cigarettes     Start date: 2005    Smokeless tobacco: Never   Vaping Use    Vaping status: Never Used   Substance Use Topics    Alcohol use: Yes     Comment: rarely    Drug use: Never       Physical Exam   Visit Vitals  BP (!) 155/102   Pulse (!) 106   Temp 37.2 °C (99 °F)   Resp 18   Ht 1.753 m (5' 9\")   Wt 145 kg (320 lb)   LMP 02/16/2025 (Approximate)   SpO2 98%   BMI 47.26 kg/m²   OB Status Having periods   Smoking Status Every Day   BSA 2.66 m²      Physical Exam  Vitals and nursing note reviewed.   Constitutional:       General: She is not in acute distress.     Appearance: Normal appearance. She is well-developed. She is obese.   HENT:      Head: Normocephalic and atraumatic.   Neck:      Vascular: No carotid bruit.   Cardiovascular:      Rate and Rhythm: Normal rate and regular rhythm.      Pulses: Normal pulses.      Heart sounds: Normal heart sounds.   Pulmonary:      Effort: Pulmonary effort is normal.      Breath sounds: Normal breath sounds.   Abdominal:      General: A surgical scar is present. Bowel sounds are normal. There is no distension.      Palpations: Abdomen is soft.      Tenderness: There is generalized abdominal tenderness. There is no right CVA tenderness, left CVA tenderness, guarding or rebound. Negative signs include Merida's sign and McBurney's sign.   Musculoskeletal:         General: No tenderness, deformity or signs of injury.      Cervical back: Normal range of motion. No rigidity.   Skin:     General: Skin is warm and dry.      Capillary Refill: Capillary refill takes less than 2 seconds.   Neurological:      General: No focal deficit present.      Mental Status: She is alert and oriented to person, place, and time.      Sensory: No sensory deficit.      Motor: No weakness. "   Psychiatric:         Mood and Affect: Mood normal.         Behavior: Behavior normal.         CT abdomen pelvis w IV contrast   Final Result   1..  No acute abdominal pelvic process   2.  Mild fatty liver   3.  Tiny fat-containing periumbilical hernia which is less than 1 cm   and smaller when compared with the prior study.   Signed by Aram Pedersen MD          Labs Reviewed   CBC WITH AUTO DIFFERENTIAL - Abnormal       Result Value    WBC 7.4      nRBC 0.0      RBC 5.44 (*)     Hemoglobin 15.9      Hematocrit 46.3 (*)     MCV 85      MCH 29.2      MCHC 34.3      RDW 13.3      Platelets 178      Neutrophils % 61.1      Immature Granulocytes %, Automated 0.1      Lymphocytes % 26.5      Monocytes % 8.7      Eosinophils % 3.1      Basophils % 0.5      Neutrophils Absolute 4.48      Immature Granulocytes Absolute, Automated 0.01      Lymphocytes Absolute 1.95      Monocytes Absolute 0.64      Eosinophils Absolute 0.23      Basophils Absolute 0.04     BASIC METABOLIC PANEL - Abnormal    Glucose 119 (*)     Sodium 137      Potassium 3.8      Chloride 104      Bicarbonate 24      Anion Gap 13      Urea Nitrogen 8      Creatinine 0.56      eGFR >90      Calcium 8.4 (*)    URINALYSIS WITH REFLEX CULTURE AND MICROSCOPIC - Abnormal    Color, Urine Yellow      Appearance, Urine Turbid (*)     Specific Gravity, Urine 1.026      pH, Urine 5.5      Protein, Urine 10 (TRACE)      Glucose, Urine Normal      Blood, Urine 0.03 (TRACE) (*)     Ketones, Urine NEGATIVE      Bilirubin, Urine NEGATIVE      Urobilinogen, Urine 2 (1+) (*)     Nitrite, Urine 2+ (*)     Leukocyte Esterase, Urine 25 Adarsh/uL (*)    MICROSCOPIC ONLY, URINE - Abnormal    WBC, Urine 6-10 (*)     RBC, Urine NONE      Squamous Epithelial Cells, Urine 10-25 (FEW)      Bacteria, Urine 1+ (*)     Mucus, Urine FEW     LIPASE - Normal    Lipase 29      Narrative:     Venipuncture immediately after or during the administration of Metamizole may lead to falsely low  results. Testing should be performed immediately prior to Metamizole dosing.   HEPATIC FUNCTION PANEL - Normal    Albumin 4.2      Bilirubin, Total 0.4      Bilirubin, Direct 0.1      Alkaline Phosphatase 59      ALT 32      AST 24      Total Protein 7.1     LACTATE - Normal    Lactate 1.6      Narrative:     Venipuncture immediately after or during the administration of Metamizole may lead to falsely low results. Testing should be performed immediately prior to Metamizole dosing.   TROPONIN I, HIGH SENSITIVITY - Normal    Troponin I, High Sensitivity 4      Narrative:     Less than 99th percentile of normal range cutoff-  Female and children under 18 years old <14 ng/L; Male <21 ng/L: Negative  Repeat testing should be performed if clinically indicated.     Female and children under 18 years old 14-50 ng/L; Male 21-50 ng/L:  Consistent with possible cardiac damage and possible increased clinical   risk. Serial measurements may help to assess extent of myocardial damage.     >50 ng/L: Consistent with cardiac damage, increased clinical risk and  myocardial infarction. Serial measurements may help assess extent of   myocardial damage.      NOTE: Children less than 1 year old may have higher baseline troponin   levels and results should be interpreted in conjunction with the overall   clinical context.     NOTE: Troponin I testing is performed using a different   testing methodology at [facility] than at other   [facility]. Direct result comparisons should only   be made within the same method.   HCG, URINE, QUALITATIVE - Normal    HCG, Urine NEGATIVE     URINE CULTURE   URINALYSIS WITH REFLEX CULTURE AND MICROSCOPIC    Narrative:     The following orders were created for panel order Urinalysis with Reflex Culture and Microscopic.  Procedure                               Abnormality         Status                     ---------                               -----------         ------                      Urinalysis with Reflex C...[[id number]]  Abnormal            Final result               Extra Urine Gray Tube[[id number]]                            In process                   Please view results for these tests on the individual orders.   EXTRA URINE GRAY TUBE         ED Course & MDM   Diagnoses as of 03/02/25 1910   Abdominal pain, generalized           Medical Decision Making  All mentioned lab results, ECGs, and imaging were independently reviewed by myself  - Patient evaluated. Patient is presenting to the emergency department for concern for abdominal pain, nausea and diarrhea.  Differential includes but is not limited to potential worsening of her hernia, colitis, gastroenteritis, pancreatitis, appendicitis, diverticulitis, potential UTI or atypical ACS to name a few.  IV was established and she was given analgesia and antiemetics, labs EKG and imaging were ordered.  EKG demonstrates sinus rhythm with a ventricular rate of 90, normal axis, normal intervals, there is some ST elevations noted in multiple leads that are not contiguous, could be signs of acute pericarditis or early repull, suspicion for STEMI is low.  She is not having any active chest pain lowering suspicion for pericarditis.  Patient's labs demonstrate a normal white count and lactate, normal kidney liver and pancreatic enzymes, normal cardiac enzymes, pregnancy test is negative.  Urine shows some nitrites with trace leukocyte esterase, she has 6-10 white cells with 10-25 squamous epithelial cells raising concern for contaminated urine sample, while antibiotics were considered I do not believe are clinically indicated in that setting.  CT scan shows no evidence of any acute pathology.  Repeat evaluation her symptoms are improved.  I feel she is stable for discharge with outpatient follow-up to a general surgeon.  Patient was provided with reassurance, questions were answered, she was discharged otherwise stable condition with strict return  "precautions.  - Monitored for any changes in stability or symptomatology. Patient remained stable.   - Counseled regarding labs, imaging, diagnosis, and plan. Patient was agreeable. All questions were answered. The patient was receptive and agreeable to the plan of care.   -The patient was instructed to return to the emergency department if any symptoms recurred, worsened, or if there were any additional concerns.    *Disclaimer: This note was dictated by speech recognition. Minor errors in transcription may be present. Please call with questions.    Kimani Lancaster MD             Your medication list        CONTINUE taking these medications        Instructions Last Dose Given Next Dose Due   Alcohol Prep Pads pads, medicated  Generic drug: alcohol swabs           pen needle, diabetic 32 gauge x 5/32\" needle  Commonly known as: BD Elida 2nd Gen Pen Needle      Use four times daily as directed with insulin              ASK your doctor about these medications        Instructions Last Dose Given Next Dose Due   atorvastatin 40 mg tablet  Commonly known as: Lipitor      Take 1 tablet (40 mg) by mouth once daily.       cholecalciferol 125 mcg (5000 UT) capsule  Commonly known as: Vitamin D-3      Take 1 capsule (125 mcg) by mouth once daily.       fluticasone 50 mcg/actuation nasal spray  Commonly known as: Flonase           insulin aspart 100 unit/mL (3 mL) pen  Commonly known as: NovoLOG Flexpen U-100 Insulin      Inject 32 Units under the skin 3 times a day before meals.       loratadine 10 mg tablet  Commonly known as: Claritin      Take 1 tablet (10 mg) by mouth once daily.       Toujeo Max U-300 SoloStar 300 unit/mL (3 mL) injection  Generic drug: insulin glargine      Inject 74 Units under the skin once daily at bedtime.                Procedure  Procedures     *This report was transcribed using voice recognition software.  Every effort was made to ensure accuracy; however, inadvertent computerized transcription " errors may be present.*  Alfa Lancaster MD  03/02/25         Alfa Lancaster MD  03/02/25 1910

## 2025-03-04 LAB
ATRIAL RATE: 90 BPM
P AXIS: 14 DEGREES
PR INTERVAL: 154 MS
Q ONSET: 253 MS
QRS COUNT: 14 BEATS
QRS DURATION: 117 MS
QT INTERVAL: 354 MS
QTC CALCULATION(BAZETT): 433 MS
QTC FREDERICIA: 405 MS
R AXIS: 9 DEGREES
T AXIS: 36 DEGREES
T OFFSET: 430 MS
VENTRICULAR RATE: 90 BPM

## 2025-03-05 LAB — BACTERIA UR CULT: ABNORMAL

## 2025-03-07 ENCOUNTER — TELEMEDICINE (OUTPATIENT)
Dept: PRIMARY CARE | Facility: CLINIC | Age: 35
End: 2025-03-07

## 2025-03-07 ENCOUNTER — TELEPHONE (OUTPATIENT)
Dept: PHARMACY | Facility: HOSPITAL | Age: 35
End: 2025-03-07

## 2025-03-07 DIAGNOSIS — N39.0 ACUTE UTI: Primary | ICD-10-CM

## 2025-03-07 PROCEDURE — 99213 OFFICE O/P EST LOW 20 MIN: CPT | Performed by: FAMILY MEDICINE

## 2025-03-07 RX ORDER — NITROFURANTOIN 25; 75 MG/1; MG/1
100 CAPSULE ORAL 2 TIMES DAILY
Qty: 14 CAPSULE | Refills: 0 | Status: SHIPPED | OUTPATIENT
Start: 2025-03-07 | End: 2025-03-15

## 2025-03-07 ASSESSMENT — ENCOUNTER SYMPTOMS
DIAPHORESIS: 0
DYSPHORIC MOOD: 0
NAUSEA: 0
SORE THROAT: 0
COUGH: 0
CONFUSION: 0
WHEEZING: 0
ADENOPATHY: 0
HEMATURIA: 0
HEADACHES: 0
FREQUENCY: 0
VOMITING: 0
LIGHT-HEADEDNESS: 0
POLYPHAGIA: 0
NUMBNESS: 0
DIARRHEA: 0
CHEST TIGHTNESS: 0
ABDOMINAL PAIN: 0
DYSURIA: 0
NERVOUS/ANXIOUS: 0
CONSTIPATION: 0
DIZZINESS: 0
POLYDIPSIA: 0
PALPITATIONS: 0
FEVER: 0
UNEXPECTED WEIGHT CHANGE: 0
SHORTNESS OF BREATH: 0
SINUS PRESSURE: 0
SINUS PAIN: 0
CHILLS: 0

## 2025-03-07 NOTE — PROGRESS NOTES
Subjective   Patient ID: Aziza Rashid is a 34 y.o. female who presents for Follow-up.    Virtual or Telephone Consent    An interactive audio and video telecommunication system which permits real time communications between the patient (at the originating site) and provider (at the distant site) was utilized to provide this telehealth service.   Verbal consent was requested and obtained from Aziza Rashid on this date, 03/07/25 for a telehealth visit and the patient's location was confirmed at the time of the visit.     HPI  Here for ED follow up. Seen 3/2/25. Note reviewed. Presented for abdominal pain. Symptoms have been ongoing for about a week, associated with some diarrhea, nausea, and bloating. CT scan and blood work with results as below. No concerning findings at the time of her ED visit, but urine culture did end up growing e.coli. She was not prescribed an antibiotic  in the ED.    Admission on 03/02/2025, Discharged on 03/02/2025   Component Date Value Ref Range Status    Ventricular Rate 03/02/2025 90  BPM Final    Atrial Rate 03/02/2025 90  BPM Final    IA Interval 03/02/2025 154  ms Final    QRS Duration 03/02/2025 117  ms Final    QT Interval 03/02/2025 354  ms Final    QTC Calculation(Bazett) 03/02/2025 433  ms Final    P Axis 03/02/2025 14  degrees Final    R Axis 03/02/2025 9  degrees Final    T [place] 03/02/2025 36  degrees Final    QRS Count 03/02/2025 14  beats Final    Q Onset 03/02/2025 253  ms Final    T Offset 03/02/2025 430  ms Final    QTC Fredericia 03/02/2025 405  ms Final    WBC 03/02/2025 7.4  4.4 - 11.3 x10*3/uL Final    nRBC 03/02/2025 0.0  0.0 - 0.0 /100 WBCs Final    RBC 03/02/2025 5.44 (H)  4.00 - 5.20 x10*6/uL Final    Hemoglobin 03/02/2025 15.9  12.0 - 16.0 g/dL Final    Hematocrit 03/02/2025 46.3 (H)  36.0 - 46.0 % Final    MCV 03/02/2025 85  80 - 100 fL Final    MCH 03/02/2025 29.2  26.0 - 34.0 pg Final    MCHC 03/02/2025 34.3  32.0 - 36.0 g/dL Final    RDW 03/02/2025  13.3  11.5 - 14.5 % Final    Platelets 03/02/2025 178  150 - 450 x10*3/uL Final    Neutrophils % 03/02/2025 61.1  40.0 - 80.0 % Final    Immature Granulocytes %, Automated 03/02/2025 0.1  0.0 - 0.9 % Final    Immature Granulocyte Count (IG) includes promyelocytes, myelocytes and metamyelocytes but does not include bands. Percent differential counts (%) should be interpreted in the context of the absolute cell counts (cells/UL).    Lymphocytes % 03/02/2025 26.5  13.0 - 44.0 % Final    Monocytes % 03/02/2025 8.7  2.0 - 10.0 % Final    Eosinophils % 03/02/2025 3.1  0.0 - 6.0 % Final    Basophils % 03/02/2025 0.5  0.0 - 2.0 % Final    Neutrophils Absolute 03/02/2025 4.48  1.20 - 7.70 x10*3/uL Final    Percent differential counts (%) should be interpreted in the context of the absolute cell counts (cells/uL).    Immature Granulocytes Absolute, Au* 03/02/2025 0.01  0.00 - 0.70 x10*3/uL Final    Lymphocytes Absolute 03/02/2025 1.95  1.20 - 4.80 x10*3/uL Final    Monocytes Absolute 03/02/2025 0.64  0.10 - 1.00 x10*3/uL Final    Eosinophils Absolute 03/02/2025 0.23  0.00 - 0.70 x10*3/uL Final    Basophils Absolute 03/02/2025 0.04  0.00 - 0.10 x10*3/uL Final    Glucose 03/02/2025 119 (H)  74 - 99 mg/dL Final    Sodium 03/02/2025 137  136 - 145 mmol/L Final    Potassium 03/02/2025 3.8  3.5 - 5.3 mmol/L Final    Chloride 03/02/2025 104  98 - 107 mmol/L Final    Bicarbonate 03/02/2025 24  21 - 32 mmol/L Final    Anion Gap 03/02/2025 13  10 - 20 mmol/L Final    Urea Nitrogen 03/02/2025 8  6 - 23 mg/dL Final    Creatinine 03/02/2025 0.56  0.50 - 1.05 mg/dL Final    eGFR 03/02/2025 >90  >60 mL/min/1.73m*2 Final    Calculations of estimated GFR are performed using the 2021 CKD-EPI Study Refit equation without the race variable for the IDMS-Traceable creatinine methods.  https://jasn.asnjournals.org/content/early/2021/09/22/ASN.2650805643    Calcium 03/02/2025 8.4 (L)  8.6 - 10.3 mg/dL Final    Lipase 03/02/2025 29  9 - 82 U/L Final     Albumin 03/02/2025 4.2  3.4 - 5.0 g/dL Final    Bilirubin, Total 03/02/2025 0.4  0.0 - 1.2 mg/dL Final    Bilirubin, Direct 03/02/2025 0.1  0.0 - 0.3 mg/dL Final    Alkaline Phosphatase 03/02/2025 59  33 - 110 U/L Final    ALT 03/02/2025 32  7 - 45 U/L Final    Patients treated with Sulfasalazine may generate falsely decreased results for ALT.    AST 03/02/2025 24  9 - 39 U/L Final    Total Protein 03/02/2025 7.1  6.4 - 8.2 g/dL Final    Lactate 03/02/2025 1.6  0.4 - 2.0 mmol/L Final    Troponin I, High Sensitivity 03/02/2025 4  0 - 13 ng/L Final    HCG, Urine 03/02/2025 NEGATIVE  NEGATIVE Final    Color, Urine 03/02/2025 Yellow  Light-Yellow, Yellow, Dark-Yellow Final    Appearance, Urine 03/02/2025 Turbid (N)  Clear Final    Specific Gravity, Urine 03/02/2025 1.026  1.005 - 1.035 Final    pH, Urine 03/02/2025 5.5  5.0, 5.5, 6.0, 6.5, 7.0, 7.5, 8.0 Final    Protein, Urine 03/02/2025 10 (TRACE)  NEGATIVE, 10 (TRACE), 20 (TRACE) mg/dL Final    Glucose, Urine 03/02/2025 Normal  Normal mg/dL Final    Blood, Urine 03/02/2025 0.03 (TRACE) (A)  NEGATIVE mg/dL Final    Ketones, Urine 03/02/2025 NEGATIVE  NEGATIVE mg/dL Final    Bilirubin, Urine 03/02/2025 NEGATIVE  NEGATIVE mg/dL Final    Urobilinogen, Urine 03/02/2025 2 (1+) (A)  Normal mg/dL Final    Due to a manufacturing issue, low positive urobilinogen results may be falsely positive. Correlate with urine bilirubin and additional clinical/laboratory findings to assess the risk of hemolytic anemia or liver disease. If clinically indicated, repeat testing with an alternate method is available by contacting the laboratory within 24 hours.    Some pigments and medications may cause a false positive urobilinogen.    Nitrite, Urine 03/02/2025 2+ (A)  NEGATIVE Final    Leukocyte Esterase, Urine 03/02/2025 25 Adarsh/uL (A)  NEGATIVE Final    Extra Tube 03/02/2025 Hold for add-ons.   Final    Auto resulted.    WBC, Urine 03/02/2025 6-10 (A)  1-5, NONE /HPF Final    RBC, Urine  03/02/2025 NONE  NONE, 1-2, 3-5 /HPF Final    Squamous Epithelial Cells, Urine 03/02/2025 10-25 (FEW)  Reference range not established. /HPF Final    Bacteria, Urine 03/02/2025 1+ (A)  NONE SEEN /HPF Final    Mucus, Urine 03/02/2025 FEW  Reference range not established. /LPF Final    Urine Culture 03/02/2025 >=100,000 CFU/mL Escherichia coli (A)   Final        === 03/02/25 ===    CT ABDOMEN PELVIS W IV CONTRAST    - Impression -  1..  No acute abdominal pelvic process  2.  Mild fatty liver  3.  Tiny fat-containing periumbilical hernia which is less than 1 cm  and smaller when compared with the prior study.  Signed by Aram Pedersen MD     Review of Systems   Constitutional:  Negative for chills, diaphoresis, fever and unexpected weight change.   HENT:  Negative for congestion, sinus pressure, sinus pain, sneezing and sore throat.    Respiratory:  Negative for cough, chest tightness, shortness of breath and wheezing.    Cardiovascular:  Negative for chest pain, palpitations and leg swelling.   Gastrointestinal:  Negative for abdominal pain, constipation, diarrhea, nausea and vomiting.   Endocrine: Negative for cold intolerance, heat intolerance, polydipsia, polyphagia and polyuria.   Genitourinary:  Negative for dysuria, frequency, hematuria and urgency.   Neurological:  Negative for dizziness, syncope, light-headedness, numbness and headaches.   Hematological:  Negative for adenopathy.   Psychiatric/Behavioral:  Negative for confusion and dysphoric mood. The patient is not nervous/anxious.          Assessment/Plan   Problem List Items Addressed This Visit       Acute UTI - Primary     - symptoms likely secondary to urinary tract infection. Will treat with macrobid. If no improvement, she is to call the office          Relevant Medications    nitrofurantoin, macrocrystal-monohydrate, (Macrobid) 100 mg capsule

## 2025-03-07 NOTE — PROGRESS NOTES
EDPD Note: Rapid Result Review    I reviewed Aziza Rashid 's chart regarding a positive urine culture/result that was taken during their recent emergency room visit. The patient was not told about these results prior to leaving the emergency department. Therefore, patient was not contacted as proper education was given by another provider: Dr. Olson (PCP) . Patient was prescribed abx therapy on subsequent follow up appointment.      Susceptibility data from last 90 days.  Collected Specimen Info Organism Ampicillin Cefazolin Cefazolin (uncomplicated UTIs only) Ciprofloxacin Gentamicin Nitrofurantoin Piperacillin/Tazobactam Trimethoprim/Sulfamethoxazole   03/02/25 Urine from Clean Catch/Voided Escherichia coli  S  S  S  S  S  S  S  S     Admission on 03/02/2025, Discharged on 03/02/2025   Component Date Value Ref Range Status    Ventricular Rate 03/02/2025 90  BPM Final    Atrial Rate 03/02/2025 90  BPM Final    MS Interval 03/02/2025 154  ms Final    QRS Duration 03/02/2025 117  ms Final    QT Interval 03/02/2025 354  ms Final    QTC Calculation(Bazett) 03/02/2025 433  ms Final    P Axis 03/02/2025 14  degrees Final    R Axis 03/02/2025 9  degrees Final    T [place] 03/02/2025 36  degrees Final    QRS Count 03/02/2025 14  beats Final    Q Onset 03/02/2025 253  ms Final    T Offset 03/02/2025 430  ms Final    QTC Fredericia 03/02/2025 405  ms Final    WBC 03/02/2025 7.4  4.4 - 11.3 x10*3/uL Final    nRBC 03/02/2025 0.0  0.0 - 0.0 /100 WBCs Final    RBC 03/02/2025 5.44 (H)  4.00 - 5.20 x10*6/uL Final    Hemoglobin 03/02/2025 15.9  12.0 - 16.0 g/dL Final    Hematocrit 03/02/2025 46.3 (H)  36.0 - 46.0 % Final    MCV 03/02/2025 85  80 - 100 fL Final    MCH 03/02/2025 29.2  26.0 - 34.0 pg Final    MCHC 03/02/2025 34.3  32.0 - 36.0 g/dL Final    RDW 03/02/2025 13.3  11.5 - 14.5 % Final    Platelets 03/02/2025 178  150 - 450 x10*3/uL Final    Neutrophils % 03/02/2025 61.1  40.0 - 80.0 % Final    Immature Granulocytes %,  Automated 03/02/2025 0.1  0.0 - 0.9 % Final    Immature Granulocyte Count (IG) includes promyelocytes, myelocytes and metamyelocytes but does not include bands. Percent differential counts (%) should be interpreted in the context of the absolute cell counts (cells/UL).    Lymphocytes % 03/02/2025 26.5  13.0 - 44.0 % Final    Monocytes % 03/02/2025 8.7  2.0 - 10.0 % Final    Eosinophils % 03/02/2025 3.1  0.0 - 6.0 % Final    Basophils % 03/02/2025 0.5  0.0 - 2.0 % Final    Neutrophils Absolute 03/02/2025 4.48  1.20 - 7.70 x10*3/uL Final    Percent differential counts (%) should be interpreted in the context of the absolute cell counts (cells/uL).    Immature Granulocytes Absolute, Au* 03/02/2025 0.01  0.00 - 0.70 x10*3/uL Final    Lymphocytes Absolute 03/02/2025 1.95  1.20 - 4.80 x10*3/uL Final    Monocytes Absolute 03/02/2025 0.64  0.10 - 1.00 x10*3/uL Final    Eosinophils Absolute 03/02/2025 0.23  0.00 - 0.70 x10*3/uL Final    Basophils Absolute 03/02/2025 0.04  0.00 - 0.10 x10*3/uL Final    Glucose 03/02/2025 119 (H)  74 - 99 mg/dL Final    Sodium 03/02/2025 137  136 - 145 mmol/L Final    Potassium 03/02/2025 3.8  3.5 - 5.3 mmol/L Final    Chloride 03/02/2025 104  98 - 107 mmol/L Final    Bicarbonate 03/02/2025 24  21 - 32 mmol/L Final    Anion Gap 03/02/2025 13  10 - 20 mmol/L Final    Urea Nitrogen 03/02/2025 8  6 - 23 mg/dL Final    Creatinine 03/02/2025 0.56  0.50 - 1.05 mg/dL Final    eGFR 03/02/2025 >90  >60 mL/min/1.73m*2 Final    Calculations of estimated GFR are performed using the 2021 CKD-EPI Study Refit equation without the race variable for the IDMS-Traceable creatinine methods.  https://jasn.asnjournals.org/content/early/2021/09/22/ASN.8879014085    Calcium 03/02/2025 8.4 (L)  8.6 - 10.3 mg/dL Final    Lipase 03/02/2025 29  9 - 82 U/L Final    Albumin 03/02/2025 4.2  3.4 - 5.0 g/dL Final    Bilirubin, Total 03/02/2025 0.4  0.0 - 1.2 mg/dL Final    Bilirubin, Direct 03/02/2025 0.1  0.0 - 0.3 mg/dL  Final    Alkaline Phosphatase 03/02/2025 59  33 - 110 U/L Final    ALT 03/02/2025 32  7 - 45 U/L Final    Patients treated with Sulfasalazine may generate falsely decreased results for ALT.    AST 03/02/2025 24  9 - 39 U/L Final    Total Protein 03/02/2025 7.1  6.4 - 8.2 g/dL Final    Lactate 03/02/2025 1.6  0.4 - 2.0 mmol/L Final    Troponin I, High Sensitivity 03/02/2025 4  0 - 13 ng/L Final    HCG, Urine 03/02/2025 NEGATIVE  NEGATIVE Final    Color, Urine 03/02/2025 Yellow  Light-Yellow, Yellow, Dark-Yellow Final    Appearance, Urine 03/02/2025 Turbid (N)  Clear Final    Specific Gravity, Urine 03/02/2025 1.026  1.005 - 1.035 Final    pH, Urine 03/02/2025 5.5  5.0, 5.5, 6.0, 6.5, 7.0, 7.5, 8.0 Final    Protein, Urine 03/02/2025 10 (TRACE)  NEGATIVE, 10 (TRACE), 20 (TRACE) mg/dL Final    Glucose, Urine 03/02/2025 Normal  Normal mg/dL Final    Blood, Urine 03/02/2025 0.03 (TRACE) (A)  NEGATIVE mg/dL Final    Ketones, Urine 03/02/2025 NEGATIVE  NEGATIVE mg/dL Final    Bilirubin, Urine 03/02/2025 NEGATIVE  NEGATIVE mg/dL Final    Urobilinogen, Urine 03/02/2025 2 (1+) (A)  Normal mg/dL Final    Due to a manufacturing issue, low positive urobilinogen results may be falsely positive. Correlate with urine bilirubin and additional clinical/laboratory findings to assess the risk of hemolytic anemia or liver disease. If clinically indicated, repeat testing with an alternate method is available by contacting the laboratory within 24 hours.    Some pigments and medications may cause a false positive urobilinogen.    Nitrite, Urine 03/02/2025 2+ (A)  NEGATIVE Final    Leukocyte Esterase, Urine 03/02/2025 25 Adarsh/uL (A)  NEGATIVE Final    Extra Tube 03/02/2025 Hold for add-ons.   Final    Auto resulted.    WBC, Urine 03/02/2025 6-10 (A)  1-5, NONE /HPF Final    RBC, Urine 03/02/2025 NONE  NONE, 1-2, 3-5 /HPF Final    Squamous Epithelial Cells, Urine 03/02/2025 10-25 (FEW)  Reference range not established. /HPF Final    Bacteria,  Urine 03/02/2025 1+ (A)  NONE SEEN /HPF Final    Mucus, Urine 03/02/2025 FEW  Reference range not established. /LPF Final    Urine Culture 03/02/2025 >=100,000 CFU/mL Escherichia coli (A)   Final       No further follow up needed from EDPD Team.     If there are any other questions for the ED Post-Discharge Culture Follow Up Team, please contact [phone]. Fax: [phone].    Miguelito Garay, VondaD

## 2025-03-07 NOTE — PATIENT INSTRUCTIONS
Aziza Rashid ,    Thank you for coming in today. We at [facility] appreciate your trust in our care. If you have any questions or concerns about the care you received today, please do not hesitate to contact us at [phone].    The following instructions were discussed today:    - start macrobid 100 mg twice daily for 7 days

## 2025-03-07 NOTE — ASSESSMENT & PLAN NOTE
- symptoms likely secondary to urinary tract infection. Will treat with macrobid. If no improvement, she is to call the office

## 2025-04-23 ENCOUNTER — APPOINTMENT (OUTPATIENT)
Dept: PRIMARY CARE | Facility: CLINIC | Age: 35
End: 2025-04-23
Payer: COMMERCIAL